# Patient Record
Sex: FEMALE | Race: WHITE | NOT HISPANIC OR LATINO | Employment: UNEMPLOYED | URBAN - METROPOLITAN AREA
[De-identification: names, ages, dates, MRNs, and addresses within clinical notes are randomized per-mention and may not be internally consistent; named-entity substitution may affect disease eponyms.]

---

## 2017-09-25 ENCOUNTER — TRANSFERRED RECORDS (OUTPATIENT)
Dept: HEALTH INFORMATION MANAGEMENT | Facility: CLINIC | Age: 42
End: 2017-09-25

## 2018-08-02 ENCOUNTER — OFFICE VISIT (OUTPATIENT)
Dept: FAMILY MEDICINE | Facility: CLINIC | Age: 43
End: 2018-08-02
Payer: COMMERCIAL

## 2018-08-02 VITALS
SYSTOLIC BLOOD PRESSURE: 112 MMHG | HEART RATE: 76 BPM | HEIGHT: 66 IN | WEIGHT: 118.8 LBS | TEMPERATURE: 98.9 F | BODY MASS INDEX: 19.09 KG/M2 | DIASTOLIC BLOOD PRESSURE: 78 MMHG

## 2018-08-02 DIAGNOSIS — F31.81 BIPOLAR 2 DISORDER (H): Primary | ICD-10-CM

## 2018-08-02 DIAGNOSIS — L98.9 SKIN LESION: ICD-10-CM

## 2018-08-02 DIAGNOSIS — N30.10 INTERSTITIAL CYSTITIS: ICD-10-CM

## 2018-08-02 PROCEDURE — 99203 OFFICE O/P NEW LOW 30 MIN: CPT | Performed by: FAMILY MEDICINE

## 2018-08-02 RX ORDER — DIAZEPAM 5 MG
5 TABLET ORAL
COMMUNITY
End: 2018-12-14

## 2018-08-02 RX ORDER — LEVOMEFOLATE CALCIUM 15 MG
15 TABLET ORAL DAILY
COMMUNITY
End: 2018-10-16

## 2018-08-02 RX ORDER — OXCARBAZEPINE 300 MG/1
600 TABLET, FILM COATED ORAL AT BEDTIME
COMMUNITY

## 2018-08-02 RX ORDER — ESCITALOPRAM OXALATE 10 MG/1
10 TABLET ORAL DAILY
COMMUNITY
End: 2018-08-27

## 2018-08-02 RX ORDER — ALPRAZOLAM 0.5 MG
0.5 TABLET ORAL DAILY PRN
COMMUNITY

## 2018-08-02 RX ORDER — ZIPRASIDONE HYDROCHLORIDE 20 MG/1
20 CAPSULE ORAL DAILY
COMMUNITY
End: 2022-11-29

## 2018-08-02 NOTE — MR AVS SNAPSHOT
After Visit Summary   8/2/2018    Nia Schneider    MRN: [id number]           Patient Information     Date Of Birth          1975        Visit Information        Provider Department      8/2/2018 10:40 AM Radha Olmstead,  [facility]        Today's Diagnoses     Interstitial cystitis    -  1    Bipolar 2 disorder (H)        Skin lesion          Care Instructions    We'll work on getting psychiatry and therapy going for you.  You should get a call in 1-2 days for help with scheduling both.  Please just let me know when you need a refill of your medications.    I also placed a dermatology referral for you.  You should call to schedule that appointment.    We'll work on getting your past medical records.      Please come back for your well woman exam when you are able.    Nice meeting you today!          Follow-ups after your visit        Additional Services     DERMATOLOGY REFERRAL       Your provider has referred you to: [facility]: [facility] [phone]   [email or web address]    Please be aware that coverage of these services is subject to the terms and limitations of your health insurance plan.  Call member services at your health plan with any benefit or coverage questions.      Please bring the following with you to your appointment:    (1) Any X-Rays, CTs or MRIs which have been performed.  Contact the facility where they were done to arrange for  prior to your scheduled appointment.    (2) List of current medications  (3) This referral request   (4) Any documents/labs given to you for this referral            MENTAL HEALTH REFERRAL  - Adult; Psychiatry and Medication Management, Outpatient Treatment; Individual/Couples/Family/Group Therapy/Health Psychology; Other: [facility] [phone]; We will contact you to schedule the appo...       All scheduling is subject to the client's specific  "insurance plan & benefits, provider/location availability, and provider clinical specialities.  Please arrive 15 minutes early for your first appointment and bring your completed paperwork.    Please be aware that coverage of these services is subject to the terms and limitations of your health insurance plan.  Call member services at your health plan with any benefit or coverage questions.                            Who to contact     Normal or non-critical lab and imaging results will be communicated to you by ConnectEduhart, letter or phone within 4 business days after the clinic has received the results. If you do not hear from us within 7 days, please contact the clinic through Mesolightt or phone. If you have a critical or abnormal lab result, we will notify you by phone as soon as possible.  Submit refill requests through [employer] or call your pharmacy and they will forward the refill request to us. Please allow 3 business days for your refill to be completed.          If you need to speak with a  for additional information , please call: [phone]           Additional Information About Your Visit        Emp[employer] Information     [employer] gives you secure access to your electronic health record. If you see a primary care provider, you can also send messages to your care team and make appointments. If you have questions, please call your primary care clinic.  If you do not have a primary care provider, please call [phone] and they will assist you.        Care EveryWhere ID     This is your Care EveryWhere ID. This could be used by other organizations to access your [place] medical records  [id number]        Your Vitals Were     Pulse Temperature Height Breastfeeding? BMI (Body Mass Index)       76 98.9  F (37.2  C) (Tympanic) 5' 6.25\" (1.683 m) No 19.03 kg/m2        Blood Pressure from Last 3 Encounters:   08/02/18 112/78    Weight from Last 3 Encounters:   08/02/18 118 lb 12.8 oz (53.9 kg)    "           We Performed the Following     DERMATOLOGY REFERRAL     MENTAL HEALTH REFERRAL  - Adult; Psychiatry and Medication Management, Outpatient Treatment; Individual/Couples/Family/Group Therapy/Health Psychology; Other: [facility] [phone]; We will contact you to schedule the appo...        Primary Care Provider Office Phone # Fax #    Radha Olmstead [phone] [address] [place] [address]        Equal Access to Services     [facility] : Hadii aad ku hadasho Soomaali, waaxda luqadaha, qaybta kaalmada adeegyada, waxay idiin hayaan adeeg lily kahn . So [facility] [phone].    ATENCIÓN: Si poppyla yinka, tiene a vieira disposición servicios gratuitos de asistencia lingüística. Llame al [phone].    We comply with applicable federal civil rights laws and [place] laws. We do not discriminate on the basis of race, color, national origin, age, disability, sex, sexual orientation, or gender identity.            Thank you!     Thank you for choosing [facility]  for your care. Our goal is always to provide you with excellent care. Hearing back from our patients is one way we can continue to improve our services. Please take a few minutes to complete the written survey that you may receive in the mail after your visit with us. Thank you!             Your Updated Medication List - Protect others around you: Learn how to safely use, store and throw away your medicines at www.disposemymeds.org.          This list is accurate as of 8/2/18 11:20 AM.  Always use your most recent med list.                   Brand Name Dispense Instructions for use Diagnosis    GEODON 20 MG capsule   Generic drug:  ziprasidone      Take 20 mg by mouth daily    Bipolar 2 disorder (H)       LEXAPRO 10 MG tablet   Generic drug:  escitalopram      Take 10 mg by mouth daily    Bipolar 2 disorder (H)       methylfolate 15 MG Tabs tablet    DEPLIN     Take 15 mg by  mouth daily    Bipolar 2 disorder (H)       TRILEPTAL 300 MG tablet   Generic drug:  OXcarbazepine      300mg in the morning and 600 mg in the evening    Bipolar 2 disorder (H)       VALIUM 5 MG tablet   Generic drug:  diazepam      Place 5 mg vaginally nightly as needed for pain    Interstitial cystitis       XANAX 0.5 MG tablet   Generic drug:  ALPRAZolam      Take 0.5 mg by mouth daily as needed for anxiety

## 2018-08-02 NOTE — NURSING NOTE
"Initial /78  Pulse 76  Temp 98.9  F (37.2  C) (Tympanic)  Ht 5' 6.25\" (1.683 m)  Wt 118 lb 12.8 oz (53.9 kg)  Breastfeeding? No  BMI 19.03 kg/m2 Estimated body mass index is 19.03 kg/(m^2) as calculated from the following:    Height as of this encounter: 5' 6.25\" (1.683 m).    Weight as of this encounter: 118 lb 12.8 oz (53.9 kg). .      "

## 2018-08-02 NOTE — PATIENT INSTRUCTIONS
We'll work on getting psychiatry and therapy going for you.  You should get a call in 1-2 days for help with scheduling both.  Please just let me know when you need a refill of your medications.    I also placed a dermatology referral for you.  You should call to schedule that appointment.    We'll work on getting your past medical records.      Please come back for your well woman exam when you are able.    Nice meeting you today!

## 2018-08-02 NOTE — PROGRESS NOTES
"  SUBJECTIVE:   Nia Schneider is a 43 year old female who presents to clinic today for the following health issues:    New Patient/Transfer of Care from [place]    * bipolar disorder - stable on medication  * lyme's disease - ? interstital cystitis.  * mole on back - present for years.  Unclear if it has changed.  No itching or bleeding.    Has a h/o bipolar 2.  Has been seeing [facility] for 4 years, on the same medications for the last 2 years.  Was discharged from [facility] in [place].  Was told she should see primary care for medication refills.  Did continue to see a therapist regularly and would like to reestablish.  Was doing blood work with [facility], no concerns with medications.    Uses xanax mostly for travel and at night.  Typically 30 tabs every 6 months or more.  Did use more regularly when she first began meds but doing much better now    *  H/o of intersitial cystitis.  Was prescribed valium to use vaginally for voiding symptoms, primarily suprapubic pain.  Feels this is effective, she uses it infrequently.  Some concern that the IC might be related to a \"chronic Lyme\" infection.  Has an appointment upcoming with a Lyme specialist,  Dr Amy Yao      * mole present for years,  has been watching.  Feels it might be changing but not sure.    Not up to date with pap or breast screening.   Would prefer to reschedule a well woman visit.        Problem list and histories reviewed & adjusted, as indicated.  Additional history: as documented    Reviewed and updated as needed this visit by clinical staff  Tobacco  Allergies  Meds  Med Hx  Surg Hx  Fam Hx  Soc Hx      Reviewed and updated as needed this visit by Provider  Tobacco  Med Hx  Surg Hx  Fam Hx  Soc Hx        ROS: Remainder of Constitutional, CV, Respiratory, GI,  negative with exception of that mentioned above    PE:  VS as above   Gen:  WN/WD/WH female in NAD   Psych: Alert and oriented times 3; coherent speech, normal   rate and " volume, able to articulate logical thoughts, able   to abstract reason, no tangential thoughts, no hallucinations   or delusions  Her affect is bright and appropriate   Skin:  Mole on central back with raised darker area and flat asymmetric area.    A?P:      ICD-10-CM    1. Bipolar 2 disorder (H) F31.81 OXcarbazepine (TRILEPTAL) 300 MG tablet     escitalopram (LEXAPRO) 10 MG tablet     ziprasidone (GEODON) 20 MG capsule     methylfolate (DEPLIN) 15 MG TABS tablet     MENTAL HEALTH REFERRAL  - Adult; Psychiatry and Medication Management, Outpatient Treatment; Individual/Couples/Family/Group Therapy/Health Psychology; Other: [facility] [phone]; We will contact you to schedule the appo...   2. Interstitial cystitis N30.10 diazepam (VALIUM) 5 MG tablet   3. Skin lesion L98.9 DERMATOLOGY REFERRAL     Patient Instructions   We'll work on getting psychiatry and therapy going for you.  You should get a call in 1-2 days for help with scheduling both.  Please just let me know when you need a refill of your medications.    I also placed a dermatology referral for you.  You should call to schedule that appointment.    We'll work on getting your past medical records.      Please come back for your well woman exam when you are able.    Nice meeting you today!    20 minutes of 20 minute visit spent reviewing past history, treatment and plans moving forward.

## 2018-08-06 ENCOUNTER — HEALTH MAINTENANCE LETTER (OUTPATIENT)
Age: 43
End: 2018-08-06

## 2018-08-07 ENCOUNTER — MYC MEDICAL ADVICE (OUTPATIENT)
Dept: FAMILY MEDICINE | Facility: CLINIC | Age: 43
End: 2018-08-07

## 2018-08-07 NOTE — TELEPHONE ENCOUNTER
Please keep and eye out for this pt's records from her psychiatrist.  She wishes to rescind her release and have them destroyed.      If they come when I am out of clinic please destroy them and let her know.    Radha Olmstead

## 2018-08-27 ENCOUNTER — MYC MEDICAL ADVICE (OUTPATIENT)
Dept: FAMILY MEDICINE | Facility: CLINIC | Age: 43
End: 2018-08-27

## 2018-08-27 DIAGNOSIS — F31.81 BIPOLAR 2 DISORDER (H): ICD-10-CM

## 2018-08-28 RX ORDER — ESCITALOPRAM OXALATE 10 MG/1
10 TABLET ORAL DAILY
Qty: 90 TABLET | Refills: 0 | Status: SHIPPED | OUTPATIENT
Start: 2018-08-28 | End: 2018-11-26

## 2018-09-05 ENCOUNTER — OFFICE VISIT (OUTPATIENT)
Dept: PSYCHOLOGY | Facility: CLINIC | Age: 43
End: 2018-09-05
Attending: FAMILY MEDICINE
Payer: COMMERCIAL

## 2018-09-05 DIAGNOSIS — F31.81 BIPOLAR II DISORDER (H): Primary | ICD-10-CM

## 2018-09-05 PROCEDURE — 90834 PSYTX W PT 45 MINUTES: CPT | Performed by: COUNSELOR

## 2018-09-05 ASSESSMENT — ANXIETY QUESTIONNAIRES
1. FEELING NERVOUS, ANXIOUS, OR ON EDGE: SEVERAL DAYS
6. BECOMING EASILY ANNOYED OR IRRITABLE: NOT AT ALL
GAD7 TOTAL SCORE: 3
IF YOU CHECKED OFF ANY PROBLEMS ON THIS QUESTIONNAIRE, HOW DIFFICULT HAVE THESE PROBLEMS MADE IT FOR YOU TO DO YOUR WORK, TAKE CARE OF THINGS AT HOME, OR GET ALONG WITH OTHER PEOPLE: SOMEWHAT DIFFICULT
5. BEING SO RESTLESS THAT IT IS HARD TO SIT STILL: NOT AT ALL
2. NOT BEING ABLE TO STOP OR CONTROL WORRYING: SEVERAL DAYS
7. FEELING AFRAID AS IF SOMETHING AWFUL MIGHT HAPPEN: NOT AT ALL
3. WORRYING TOO MUCH ABOUT DIFFERENT THINGS: SEVERAL DAYS

## 2018-09-05 ASSESSMENT — PATIENT HEALTH QUESTIONNAIRE - PHQ9: 5. POOR APPETITE OR OVEREATING: NOT AT ALL

## 2018-09-05 NOTE — PROGRESS NOTES
Progress Note - Initial Session    Client Name:  Nia Schneider Date: 9/5/18         Service Type: Individual      Session Start Time:11:00 am  Session End Time: 11:50 am      Session Length: 38 - 52      Session #: 1     Attendees: Client attended alone         Diagnostic Assessment in progress.  Unable to complete documentation at the conclusion of the first session due to time dedicated to explaining limits of confidentiality and rapport building. Client reports history of Bipolar II, diagnosed in 2014, which she is treating with medications. Client recently moved from [place] back to [place] in June 2018, and was encouraged by her previous therapist to seek additional services. Client is currently seeing Dr. Dioni Damian at [facility] for psychiatry, and will do follow-up appointments twice per year. Client notes her  is a  and is gone every other week, leading to client to be lonely and often isolated. Client reports having a strained relationship with her parents, and identified having one friend who lives in [place].     Mental Status Assessment:  Appearance:   Appropriate   Eye Contact:   Good   Psychomotor Behavior: Normal   Attitude:   Cooperative   Orientation:   All  Speech   Rate / Production: Normal    Volume:  Normal   Mood:    Normal  Affect:    Appropriate   Thought Content:  Clear   Thought Form:  Coherent  Logical   Insight:    Fair       Safety Issues and Plan for Safety and Risk Management:  Client denies current fears or concerns for personal safety.  Client denies current or recent suicidal ideation or behaviors.  Client denies current or recent homicidal ideation or behaviors.  Client denies current or recent self injurious behavior or ideation.  Client denies other safety concerns.  A safety and risk management plan has not been developed at this time, however client was given the after-hours number / 911 should there be a change in any of  these risk factors.  Client reports there are no firearms in the house.      Diagnostic Criteria:  History of Hypomania, symptoms included:  A. A distinct period of abnormally and persistently elevated, expansive, or irritable mood and abnormally and persistently increased activity or energy lasting at least 4 consecutive days and presentmost of the day, nearly every day.  B. During the period of mood disturbance and increased energy and activity, three (or more) of the following symptoms have persisted (four if the mood is only irritable), represent a nonticeable change from usual behaivor, and have been present to a degree:   - decreased need for sleep (e.g., feels rested after only 3 hours of sleep)    - flight of ideas or subjective experience that thoughts are racing   - increase in goal-directed activity  C. The episode is associated with an unequivocal change in functioning that is uncharacteristic of the person when not symptomatic  D. The disturbance in mood and the change in functioning are observable by others  E. The episode is not severe enough to cause marked impairment in social or occupational functioning or to necessitate hospitalization, and does not have psychotic features.  F. The symptoms are not attributable to the direct physiological effects of a substance or a general medical condition        DSM5 Diagnoses: (Sustained by DSM5 Criteria Listed Above)  Diagnoses: 296.89 Bipolar II Disorder Depressed and With anxious distress  Psychosocial & Contextual Factors: Limited support network, Family conflict  WHODAS 2.0 (12 item)            This questionnaire asks about difficulties due to health conditions. Health conditions  include  disease or illnesses, other health problems that may be short or long lasting,  injuries, mental health or emotional problems, and problems with alcohol or drugs.                     Think back over the past 30 days and answer these questions, thinking about how much   difficulty you had doing the following activities. For each question, please [place] only  one response.    S1 Standing for long periods such as 30 minutes? None =         1   S2 Taking care of household responsibilities? None =         1   S3 Learning a new task, for example, learning how to get to a new place? None =         1   S4 How much of a problem do you have joining community activities (for example, festivals, Congregational or other activities) in the same way as anyone else can? Mild =           2   S5 How much have you been emotionally affected by your health problems? Moderate =   3     In the past 30 days, how much difficulty did you have in:   S6 Concentrating on doing something for ten minutes? None =         1   S7 Walking a long distance such as a kilometer (or equivalent)? Mild =           2   S8 Washing your whole body? None =         1   S9 Getting dressed? None =         1   S10 Dealing with people you do not know? Mild =           2   S11 Maintaining a friendship? None =         1   S12 Your day to day work? Mild =           2     H1 Overall, in the past 30 days, how many days were these difficulties present? Record number of days 25   H2 In the past 30 days, for how many days were you totally unable to carry out your usual activities or work because of any health condition? Record number of days  0   H3 In the past 30 days, not counting the days that you were totally unable, for how many days did you cut back or reduce your usual activities or work because of any health condition? Record number of days 30       Collateral Reports Completed:  Routed note to PCP      PLAN: (Homework, other):  Follow-up appointment scheduled to complete Diagnostic Interview.         Chayito Osman MA, [facility]

## 2018-09-05 NOTE — Clinical Note
Misa Christian Dr. attended her initial session today. Will send over the completed DA once completed.  Thanks! Samina

## 2018-09-05 NOTE — MR AVS SNAPSHOT
MRN:[id number]                      After Visit Summary   9/5/2018    Nia Schneider    MRN: [id number]           Visit Information        Provider Department      9/5/2018 11:00 AM [facility]      Your next 10 appointments already scheduled     Sep 06, 2018 11:20 AM CDT   [facility] Radha Olmstead,    [facility]    [address] [address]   [phone]            Sep 12, 2018  9:00 AM CDT   Return Visit with Chayito Osman [facility] ([facility]    [address] [id number] [phone]            Oct 03, 2018 10:00 AM CDT   New Visit with Rebecca Jackman [facility]-C   [facility]    [address] [id number] [phone]            Oct 04, 2018 12:30 PM CDT   [facility]    [address] [id number] [phone]              MyChart Information     [facility] gives you secure access to your electronic health record. If you see a primary care provider, you can also send messages to your care team and make appointments. If you have questions, please call your primary care clinic.  If you do not have a primary care provider, please call [phone] and they will assist you.        Care EveryWhere ID     This is your Care EveryWhere ID. This could be used by other organizations to access your [place] medical records  [id number]        Equal Access to Services     PAVAN QUIROS AH: Hadii aad ku hadasho Sojuaquinali, waaxda luqadaha, qaybta kaalmada adeegyada, fran kulkarni. So [facility] [phone].    ATENCIÓN: Si habla español, tiene a vieira disposición servicios gratuitos de asistencia lingüística. Llame al  [phone].    We comply with applicable federal civil rights laws and [place] laws. We do not discriminate on the basis of race, color, national origin, age, disability, sex, sexual orientation, or gender identity.

## 2018-09-06 ENCOUNTER — OFFICE VISIT (OUTPATIENT)
Dept: FAMILY MEDICINE | Facility: CLINIC | Age: 43
End: 2018-09-06
Payer: COMMERCIAL

## 2018-09-06 VITALS
HEIGHT: 66 IN | TEMPERATURE: 97.8 F | SYSTOLIC BLOOD PRESSURE: 110 MMHG | DIASTOLIC BLOOD PRESSURE: 78 MMHG | WEIGHT: 119.4 LBS | HEART RATE: 68 BPM | BODY MASS INDEX: 19.19 KG/M2

## 2018-09-06 DIAGNOSIS — Z23 NEED FOR PROPHYLACTIC VACCINATION AND INOCULATION AGAINST INFLUENZA: ICD-10-CM

## 2018-09-06 DIAGNOSIS — Z11.51 SCREENING FOR HUMAN PAPILLOMAVIRUS: ICD-10-CM

## 2018-09-06 DIAGNOSIS — Z13.6 CARDIOVASCULAR SCREENING; LDL GOAL LESS THAN 160: ICD-10-CM

## 2018-09-06 DIAGNOSIS — Z12.4 SCREENING FOR CERVICAL CANCER: ICD-10-CM

## 2018-09-06 DIAGNOSIS — Z01.419 ENCOUNTER FOR GYNECOLOGICAL EXAMINATION WITHOUT ABNORMAL FINDING: Primary | ICD-10-CM

## 2018-09-06 DIAGNOSIS — Z12.31 ENCOUNTER FOR SCREENING MAMMOGRAM FOR BREAST CANCER: ICD-10-CM

## 2018-09-06 PROCEDURE — 87624 HPV HI-RISK TYP POOLED RSLT: CPT | Performed by: FAMILY MEDICINE

## 2018-09-06 PROCEDURE — 90686 IIV4 VACC NO PRSV 0.5 ML IM: CPT | Performed by: FAMILY MEDICINE

## 2018-09-06 PROCEDURE — 99396 PREV VISIT EST AGE 40-64: CPT | Mod: 25 | Performed by: FAMILY MEDICINE

## 2018-09-06 PROCEDURE — G0145 SCR C/V CYTO,THINLAYER,RESCR: HCPCS | Performed by: FAMILY MEDICINE

## 2018-09-06 PROCEDURE — 90471 IMMUNIZATION ADMIN: CPT | Performed by: FAMILY MEDICINE

## 2018-09-06 ASSESSMENT — PATIENT HEALTH QUESTIONNAIRE - PHQ9: SUM OF ALL RESPONSES TO PHQ QUESTIONS 1-9: 5

## 2018-09-06 ASSESSMENT — ANXIETY QUESTIONNAIRES: GAD7 TOTAL SCORE: 3

## 2018-09-06 NOTE — PROGRESS NOTES
SUBJECTIVE:   CC: Nia Schneider is an 43 year old woman who presents for preventive health visit.     Answers for HPI/ROS submitted by the patient on 9/5/2018   Annual Exam:  Getting at least 3 servings of Calcium per day:: Yes  Bi-annual eye exam:: NO  Dental care twice a year:: Yes  Sleep apnea or symptoms of sleep apnea:: None  Diet:: Other  Frequency of exercise:: 2-3 days/week  Taking medications regularly:: Yes  Medication side effects:: None  Additional concerns today:: No  PHQ-2 Score: 0  Duration of exercise:: 45-60 minutes      Today's PHQ-2 Score:   PHQ-2 ( 1999 [employer]) 9/5/2018   Q1: Little interest or pleasure in doing things 0   Q2: Feeling down, depressed or hopeless 0   PHQ-2 Score 0   Q1: Little interest or pleasure in doing things Not at all   Q2: Feeling down, depressed or hopeless Not at all   PHQ-2 Score 0       Abuse: Current or Past(Physical, Sexual or Emotional)- No  Do you feel safe in your environment - Yes    Social History   Substance Use Topics     Smoking status: Never Smoker     Smokeless tobacco: Never Used     Alcohol use No     If you drink alcohol do you typically have >3 drinks per day or >7 drinks per week? No                     Reviewed orders with patient.  Reviewed health maintenance and updated orders accordingly - Yes    Patient under age 50, mutual decision reflected in health maintenance.      Pertinent mammograms are reviewed under the imaging tab.  History of abnormal Pap smear: NO - age 30- 65 PAP every 3 years recommended  PAP / HPV 9/6/2018   PAP NIL     Reviewed and updated as needed this visit by clinical staff  Tobacco  Allergies  Meds  Med Hx  Surg Hx  Fam Hx  Soc Hx        Reviewed and updated as needed this visit by Provider  Tobacco  Meds  Med Hx  Surg Hx  Fam Hx  Soc Hx       History reviewed. No pertinent past medical history.      ROS:  CONSTITUTIONAL: NEGATIVE for fever, chills, change in weight. Occasional tension headaches when chewing  "gum.  EYES: NEGATIVE for vision changes or irritation  ENT: NEGATIVE for ear, mouth and throat problems  RESP: NEGATIVE for significant cough or SOB  CV: NEGATIVE for chest pain, palpitations or peripheral edema  GI: NEGATIVE for nausea, abdominal pain, heartburn, or change in bowel habits  : NEGATIVE for unusual urinary or vaginal symptoms. No periods since ablation. No painful urination. Interstitial cystitis controlled with once daily vaginal valium.  MUSCULOSKELETAL: NEGATIVE for significant arthralgias or myalgia  PSYCHIATRIC: NEGATIVE for changes in mood or affect    OBJECTIVE:   /78  Pulse 68  Temp 97.8  F (36.6  C) (Tympanic)  Ht 5' 6.25\" (1.683 m)  Wt 119 lb 6.4 oz (54.2 kg)  Breastfeeding? No  BMI 19.13 kg/m2  EXAM:  GENERAL: healthy, alert and no distress  NECK: no adenopathy, no asymmetry, masses, or scars and thyroid normal to palpation  RESP: lungs clear to auscultation - no rales, rhonchi or wheezes  CV: regular rate and rhythm, normal S1 S2, no S3 or S4, no murmur, click or rub, no peripheral edema and peripheral pulses strong  ABDOMEN: soft, nontender, no hepatosplenomegaly, no masses and bowel sounds normal  MS: no gross musculoskeletal defects noted, no edema  : Pap smear was performed. Exam was unremarkable.  Breast: Exam unremarkable.    Diagnostic Test Results:  none     ASSESSMENT/PLAN:       ICD-10-CM    1. Encounter for gynecological examination without abnormal finding Z01.419    2. CARDIOVASCULAR SCREENING; LDL GOAL LESS THAN 160 Z13.6 Lipid panel reflex to direct LDL Fasting   3. Encounter for screening mammogram for breast cancer Z12.31 MA Screening Digital Bilateral   4. Screening for cervical cancer Z12.4 Pap imaged thin layer screen with HPV - recommended age 30 - 65 years (select HPV order below)   5. Screening for human papillomavirus Z11.51 HPV High Risk Types DNA Cervical   6. Need for prophylactic vaccination and inoculation against influenza Z23 FLU VACCINE, " "SPLIT VIRUS, IM (QUADRIVALENT) [81916]- >3 YRS     Vaccine Administration, Initial [17281]         COUNSELING:   Reviewed preventive health counseling, as reflected in patient instructions    BP Readings from Last 1 Encounters:   09/06/18 110/78     Estimated body mass index is 19.13 kg/(m^2) as calculated from the following:    Height as of this encounter: 5' 6.25\" (1.683 m).    Weight as of this encounter: 119 lb 6.4 oz (54.2 kg).           reports that she has never smoked. She has never used smokeless tobacco.      Counseling Resources:  ATP IV Guidelines  Pooled Cohorts Equation Calculator  Breast Cancer Risk Calculator  FRAX Risk Assessment  ICSI Preventive Guidelines  Dietary Guidelines for Americans, 2010  [employer]'s MyPlate  ASA Prophylaxis  Lung CA Screening    Radha Olmstead, DO  [facility]    Injectable Influenza Immunization Documentation    1.  Is the person to be vaccinated sick today?   No    2. Does the person to be vaccinated have an allergy to a component   of the vaccine?   No  Egg Allergy Algorithm Link    3. Has the person to be vaccinated ever had a serious reaction   to influenza vaccine in the past?   No    4. Has the person to be vaccinated ever had Guillain-Barré syndrome?   No    Form completed by Jacqueline Phillips CMA    Patient Instructions   You will need to be fasting for 12 hours (nothing but water) prior to your blood work.    You can call [phone] to schedule your mammogram      Preventive Health Recommendations  Female Ages 40 to 49    Yearly exam:     See your health care provider every year in order to  1. Review health changes.   2. Discuss preventive care.    3. Review your medicines if your doctor prescribed any.      Get a Pap test every three years (unless you have an abnormal result and your provider advises testing more often).      If you get Pap tests with HPV test, you only need to test every 5 years, unless you have an abnormal result. You do not need a " Pap test if your uterus was removed (hysterectomy) and you have not had cancer.      You should be tested each year for STDs (sexually transmitted diseases), if you're at risk.     Ask your doctor if you should have a mammogram.      Have a colonoscopy (test for colon cancer) if someone in your family has had colon cancer or polyps before age 50.       Have a cholesterol test every 5 years.       Have a diabetes test (fasting glucose) after age 45. If you are at risk for diabetes, you should have this test every 3 years.    Shots: Get a flu shot each year. Get a tetanus shot every 10 years.     Nutrition:     Eat at least 5 servings of fruits and vegetables each day.    Eat whole-grain bread, whole-wheat pasta and brown rice instead of white grains and rice.    Get adequate Calcium and Vitamin D.      Lifestyle    Exercise at least 150 minutes a week (an average of 30 minutes a day, 5 days a week). This will help you control your weight and prevent disease.    Limit alcohol to one drink per day.    No smoking.     Wear sunscreen to prevent skin cancer.    See your dentist every six months for an exam and cleaning.

## 2018-09-06 NOTE — PATIENT INSTRUCTIONS
You will need to be fasting for 12 hours (nothing but water) prior to your blood work.    You can call [phone] to schedule your mammogram      Preventive Health Recommendations  Female Ages 40 to 49    Yearly exam:     See your health care provider every year in order to  1. Review health changes.   2. Discuss preventive care.    3. Review your medicines if your doctor prescribed any.      Get a Pap test every three years (unless you have an abnormal result and your provider advises testing more often).      If you get Pap tests with HPV test, you only need to test every 5 years, unless you have an abnormal result. You do not need a Pap test if your uterus was removed (hysterectomy) and you have not had cancer.      You should be tested each year for STDs (sexually transmitted diseases), if you're at risk.     Ask your doctor if you should have a mammogram.      Have a colonoscopy (test for colon cancer) if someone in your family has had colon cancer or polyps before age 50.       Have a cholesterol test every 5 years.       Have a diabetes test (fasting glucose) after age 45. If you are at risk for diabetes, you should have this test every 3 years.    Shots: Get a flu shot each year. Get a tetanus shot every 10 years.     Nutrition:     Eat at least 5 servings of fruits and vegetables each day.    Eat whole-grain bread, whole-wheat pasta and brown rice instead of white grains and rice.    Get adequate Calcium and Vitamin D.      Lifestyle    Exercise at least 150 minutes a week (an average of 30 minutes a day, 5 days a week). This will help you control your weight and prevent disease.    Limit alcohol to one drink per day.    No smoking.     Wear sunscreen to prevent skin cancer.    See your dentist every six months for an exam and cleaning.

## 2018-09-06 NOTE — MR AVS SNAPSHOT
After Visit Summary   9/6/2018    Nia Schneider    MRN: [id number]           Patient Information     Date Of Birth          1975        Visit Information        Provider Department      9/6/2018 11:20 AM Radha Olmstead DO [facility]        Today's Diagnoses     CARDIOVASCULAR SCREENING; LDL GOAL LESS THAN 160    -  1    Encounter for screening mammogram for breast cancer        Screening for cervical cancer        Screening for human papillomavirus          Care Instructions    You will need to be fasting for 12 hours (nothing but water) prior to your blood work.    You can call [phone] to schedule your mammogram      Preventive Health Recommendations  Female Ages 40 to 49    Yearly exam:     See your health care provider every year in order to  1. Review health changes.   2. Discuss preventive care.    3. Review your medicines if your doctor prescribed any.      Get a Pap test every three years (unless you have an abnormal result and your provider advises testing more often).      If you get Pap tests with HPV test, you only need to test every 5 years, unless you have an abnormal result. You do not need a Pap test if your uterus was removed (hysterectomy) and you have not had cancer.      You should be tested each year for STDs (sexually transmitted diseases), if you're at risk.     Ask your doctor if you should have a mammogram.      Have a colonoscopy (test for colon cancer) if someone in your family has had colon cancer or polyps before age 50.       Have a cholesterol test every 5 years.       Have a diabetes test (fasting glucose) after age 45. If you are at risk for diabetes, you should have this test every 3 years.    Shots: Get a flu shot each year. Get a tetanus shot every 10 years.     Nutrition:     Eat at least 5 servings of fruits and vegetables each day.    Eat whole-grain bread, whole-wheat pasta and brown rice instead of white grains and rice.    Get  adequate Calcium and Vitamin D.      Lifestyle    Exercise at least 150 minutes a week (an average of 30 minutes a day, 5 days a week). This will help you control your weight and prevent disease.    Limit alcohol to one drink per day.    No smoking.     Wear sunscreen to prevent skin cancer.    See your dentist every six months for an exam and cleaning.          Follow-ups after your visit        Your next 10 appointments already scheduled     Oct 03, 2018 10:00 AM CDT   New Visit with Rebecca Jackman PA-C   [facility]    [address] [address]   [phone]              Future tests that were ordered for you today     Open Future Orders        Priority Expected Expires Ordered    Lipid panel reflex to direct LDL Fasting Routine 9/7/2018 9/6/2019 9/6/2018    MA Screening Digital Bilateral Routine  9/6/2019 9/6/2018            Who to contact     Normal or non-critical lab and imaging results will be communicated to you by [employer], letter or phone within 4 business days after the clinic has received the results. If you do not hear from us within 7 days, please contact the clinic through [employer] or phone. If you have a critical or abnormal lab result, we will notify you by phone as soon as possible.  Submit refill requests through [employer] or call your pharmacy and they will forward the refill request to us. Please allow 3 business days for your refill to be completed.          If you need to speak with a  for additional information , please call: [phone]           Additional Information About Your Visit        [employer] Information     [employer] gives you secure access to your electronic health record. If you see a primary care provider, you can also send messages to your care team and make appointments. If you have questions, please call your primary care clinic.  If you do not have a primary care provider, please call  "[phone] and they will assist you.        Care EveryWhere ID     This is your Care EveryWhere ID. This could be used by other organizations to access your [facility] medical records  [id number]        Your Vitals Were     Pulse Temperature Height Breastfeeding? BMI (Body Mass Index)       68 97.8  F (36.6  C) (Tympanic) 5' 6.25\" (1.683 m) No 19.13 kg/m2        Blood Pressure from Last 3 Encounters:   09/06/18 110/78   08/02/18 112/78    Weight from Last 3 Encounters:   09/06/18 119 lb 6.4 oz (54.2 kg)   08/02/18 118 lb 12.8 oz (53.9 kg)              We Performed the Following     HPV High Risk Types DNA Cervical     Pap imaged thin layer screen with HPV - recommended age 30 - 65 years (select HPV order below)        Primary Care Provider Office Phone # Fax #    Radha Burnham DO [facility] [phone] [address] [place] [address]        Equal Access to Services     RAZA QUIROS : Hadii aad ku hadasho Soomaali, waaxda luqadaha, qaybta kaalmada adeegyada, waxay robert kahn . So [facility] [phone].    ATENCIÓN: Si habla español, tiene a vieira disposición servicios gratuitos de asistencia lingüística. Llame al [phone].    We comply with applicable federal civil rights laws and [place] laws. We do not discriminate on the basis of race, color, national origin, age, disability, sex, sexual orientation, or gender identity.            Thank you!     Thank you for choosing [facility] [place]  for your care. Our goal is always to provide you with excellent care. Hearing back from our patients is one way we can continue to improve our services. Please take a few minutes to complete the written survey that you may receive in the mail after your visit with us. Thank you!             Your Updated Medication List - Protect others around you: Learn how to safely use, store and throw away your medicines at www.disposemymeds.org.          This list is accurate as of 9/6/18 " 12:27 PM.  Always use your most recent med list.                   Brand Name Dispense Instructions for use Diagnosis    escitalopram 10 MG tablet    LEXAPRO    90 tablet    Take 1 tablet (10 mg) by mouth daily    Bipolar 2 disorder (H)       GEODON 20 MG capsule   Generic drug:  ziprasidone      Take 20 mg by mouth daily    Bipolar 2 disorder (H)       methylfolate 15 MG Tabs tablet    DEPLIN     Take 15 mg by mouth daily    Bipolar 2 disorder (H)       TRILEPTAL 300 MG tablet   Generic drug:  OXcarbazepine      300mg in the morning and 600 mg in the evening    Bipolar 2 disorder (H)       VALIUM 5 MG tablet   Generic drug:  diazepam      Place 5 mg vaginally nightly as needed for pain    Interstitial cystitis       XANAX 0.5 MG tablet   Generic drug:  ALPRAZolam      Take 0.5 mg by mouth daily as needed for anxiety

## 2018-09-06 NOTE — NURSING NOTE
"Initial /78  Pulse 68  Temp 97.8  F (36.6  C) (Tympanic)  Ht 5' 6.25\" (1.683 m)  Wt 119 lb 6.4 oz (54.2 kg)  Breastfeeding? No  BMI 19.13 kg/m2 Estimated body mass index is 19.13 kg/(m^2) as calculated from the following:    Height as of this encounter: 5' 6.25\" (1.683 m).    Weight as of this encounter: 119 lb 6.4 oz (54.2 kg). .      "

## 2018-09-10 DIAGNOSIS — Z13.6 CARDIOVASCULAR SCREENING; LDL GOAL LESS THAN 160: ICD-10-CM

## 2018-09-10 LAB
CHOLEST SERPL-MCNC: 192 MG/DL
HDLC SERPL-MCNC: 84 MG/DL
LDLC SERPL CALC-MCNC: 92 MG/DL
NONHDLC SERPL-MCNC: 108 MG/DL
TRIGL SERPL-MCNC: 79 MG/DL

## 2018-09-10 PROCEDURE — 80061 LIPID PANEL: CPT | Performed by: FAMILY MEDICINE

## 2018-09-10 PROCEDURE — 36415 COLL VENOUS BLD VENIPUNCTURE: CPT | Performed by: FAMILY MEDICINE

## 2018-09-11 LAB
COPATH REPORT: NORMAL
PAP: NORMAL

## 2018-09-12 LAB
FINAL DIAGNOSIS: NORMAL
HPV HR 12 DNA CVX QL NAA+PROBE: NEGATIVE
HPV16 DNA SPEC QL NAA+PROBE: NEGATIVE
HPV18 DNA SPEC QL NAA+PROBE: NEGATIVE
SPECIMEN DESCRIPTION: NORMAL
SPECIMEN SOURCE CVX/VAG CYTO: NORMAL

## 2018-10-03 ENCOUNTER — OFFICE VISIT (OUTPATIENT)
Dept: DERMATOLOGY | Facility: CLINIC | Age: 43
End: 2018-10-03
Payer: COMMERCIAL

## 2018-10-03 VITALS — SYSTOLIC BLOOD PRESSURE: 112 MMHG | HEART RATE: 72 BPM | OXYGEN SATURATION: 99 % | DIASTOLIC BLOOD PRESSURE: 76 MMHG

## 2018-10-03 DIAGNOSIS — B36.0 PV (PITYRIASIS VERSICOLOR): ICD-10-CM

## 2018-10-03 DIAGNOSIS — D18.01 CHERRY ANGIOMA: ICD-10-CM

## 2018-10-03 DIAGNOSIS — D22.9 MULTIPLE BENIGN NEVI: Primary | ICD-10-CM

## 2018-10-03 DIAGNOSIS — L82.1 SEBORRHEIC KERATOSIS: ICD-10-CM

## 2018-10-03 PROCEDURE — 99203 OFFICE O/P NEW LOW 30 MIN: CPT | Performed by: PHYSICIAN ASSISTANT

## 2018-10-03 NOTE — NURSING NOTE
Chief Complaint   Patient presents with     Derm Problem     mole check       Vitals:    10/03/18 0950   BP: 112/76   Pulse: 72   SpO2: 99%     Wt Readings from Last 1 Encounters:   09/06/18 54.2 kg (119 lb 6.4 oz)     Chrissy Parkinson LPN.................10/3/2018

## 2018-10-03 NOTE — PROGRESS NOTES
Nia Schneider is a 43 year old year old female patient here today for spot on back and clavicle.  Patient reports that spot on back is new. She notes that it feels rougher than her normal moles. She also notes mole on clavicle reports that it has been present since she was young and remains unchanged. She does wear sunscreen. Patient has no other skin complaints today.  Remainder of the HPI, Meds, PMH, Allergies, FH, and SH was reviewed in chart.    Pertinent Hx:   No personal history of skin cancer.   History reviewed. No pertinent past medical history.    Past Surgical History:   Procedure Laterality Date     SURGICAL HISTORY OF -  Right 2009    ganglion cyst        Family History   Problem Relation Age of Onset     Chronic Obstructive Pulmonary Disease Mother      Osteoarthritis Mother      Hypertension Father      HEART DISEASE Father      Chronic Obstructive Pulmonary Disease Father      Diabetes No family hx of      Colon Cancer No family hx of      Breast Cancer No family hx of        Social History     Social History     Marital status:      Spouse name: N/A     Number of children: N/A     Years of education: N/A     Occupational History     Not on file.     Social History Main Topics     Smoking status: Never Smoker     Smokeless tobacco: Never Used     Alcohol use No     Drug use: No     Sexual activity: Yes     Partners: Male     Birth control/ protection: Male Surgical      Comment:  had vas     Other Topics Concern     Not on file     Social History Narrative       Outpatient Encounter Prescriptions as of 10/3/2018   Medication Sig Dispense Refill     ALPRAZolam (XANAX) 0.5 MG tablet Take 0.5 mg by mouth daily as needed for anxiety       diazepam (VALIUM) 5 MG tablet Place 5 mg vaginally nightly as needed for pain       escitalopram (LEXAPRO) 10 MG tablet Take 1 tablet (10 mg) by mouth daily 90 tablet 0     methylfolate (DEPLIN) 15 MG TABS tablet Take 15 mg by mouth daily        OXcarbazepine (TRILEPTAL) 300 MG tablet 300mg in the morning and 600 mg in the evening        ziprasidone (GEODON) 20 MG capsule Take 20 mg by mouth daily       No facility-administered encounter medications on file as of 10/3/2018.              Review Of Systems  Skin: As above  Eyes: negative  Ears/Nose/Throat: negative  Respiratory: No shortness of breath, dyspnea on exertion, cough, or hemoptysis  Cardiovascular: negative  Gastrointestinal: negative  Genitourinary: negative  Musculoskeletal: negative  Neurologic: negative  Psychiatric: negative  Hematologic/Lymphatic/Immunologic: negative  Endocrine: negative      O:   NAD, WDWN, Alert & Oriented, Mood & Affect wnl, Vitals stable   Here today alone   /76  Pulse 72  SpO2 99%   General appearance normal   Vitals stable   Alert, oriented and in no acute distress      Brown stuck on papules on back   Red papules on back   Brown papules and macules with regular pigment network and border on back and clavicle   Thin brown dry patch on back consistent with pityriasis versicolor       Eyes: Conjunctivae/lids:Normal     ENT: Lips: normal    MSK:Normal    Pulm: Breathing Normal    Neuro/Psych: Orientation:Normal; Mood/Affect:Normal  A/P:  1. Pityriasis versicolor   Patient decided against treatment.   2. Benign nevi, seborrheic keratosis, cherry angioma  BENIGN LESIONS DISCUSSED WITH PATIENT:  I discussed the specifics of tumor, prognosis, and genetics of benign lesions.  I explained that treatment of these lesions would be purely cosmetic and not medically neccessary.  I discussed with patient different removal options including excision, cautery and /or laser.      Nature and genetics of benign skin lesions dicussed with patient.  Signs and Symptoms of skin cancer discussed with patient.  ABCDEs of melanoma reviewed with patient.  Patient encouraged to perform monthly skin exams.  UV precautions reviewed with patient  Risks of non-melanoma skin cancer discussed  with patient   Return to clinic as needed.

## 2018-10-03 NOTE — MR AVS SNAPSHOT
After Visit Summary   10/3/2018    Nia Schneider    MRN: [id number]           Patient Information     Date Of Birth          1975        Visit Information        Provider Department      10/3/2018 10:00 AM Rebecca Jackman PA-C [facility]        Today's Diagnoses     Multiple benign nevi    -  1    Seborrheic keratosis        Cherry angioma        PV (pityriasis versicolor)           Follow-ups after your visit        Your next 10 appointments already scheduled     Oct 18, 2018  8:45 AM CDT   MA SCREENING DIGITAL BILATERAL with LLMA1   [facility] ([facility])    [address] [address]   [phone]           How do I prepare for my exam? (Food and drink instructions) No Food and Drink Restrictions.  How do I prepare for my exam? (Other instructions) Do not use any powder, lotion or deodorant under your arms or on your breast. If you do, we will ask you to remove it before your exam.  What should I wear: Wear comfortable, two-piece clothing.  How long does the exam take: Most scans will take 15 minutes.  What should I bring: Bring any previous mammograms from other facilities or have them mailed to the breast center.  Do I need a :  No  is needed.  What do I need to tell my doctor: If you have any allergies, tell your care team.  What should I do after the exam: No restrictions, You may resume normal activities.  What is this test: This test is an x-ray of the breast to look for breast disease. The breast is pressed between two plates to flatten and spread the tissue. An X-ray is taken of the breast from different angles.  Who should I call with questions: If you have any questions, please call the Imaging Department where you will have your exam. Directions, parking instructions, and other information is available on our website, [email or web address].  Other information about my exam Three-dimensional  "(3D) mammograms are available at [facility] locations in [facility], [place], [place], [facility], [place], [facility] and [place].  Health locations include [place] and the [facility] in [place].  Benefits of 3D mammograms include * Improved rate of cancer detection * Decreases your chance of having to go back for more tests, which means fewer: * \"False-positive\" results (This means that there is an abnormal area but it isn't cancer.) * Invasive testing procedures, such as a biopsy or surgery * Can provide clearer images of the breast if you have dense breast tissue.  *3D mammography is an optional exam that anyone can have with a 2D mammogram. It doesn't replace or take the place of a 2D mammogram. 2D mammograms remain an effective screening test for all women.  Not all insurance companies cover the cost of a 3D mammogram. Check with your insurance.              Who to contact     If you have questions or need follow up information about today's clinic visit or your schedule please contact [facility] directly at [phone].  Normal or non-critical lab and imaging results will be communicated to you by MyLorryhart, letter or phone within 4 business days after the clinic has received the results. If you do not hear from us within 7 days, please contact the clinic through [facility]Times or phone. If you have a critical or abnormal lab result, we will notify you by phone as soon as possible.  Submit refill requests through R[facility]Times or call your pharmacy and they will forward the refill request to us. Please allow 3 business days for your refill to be completed.          Additional Information About Your Visit        R[employer]Times Information     [employer] gives you secure access to your electronic health record. If you see a primary care provider, you can also send messages to your care team and make appointments. If you have questions, please call your primary care clinic.  If you " do not have a primary care provider, please call [phone] and they will assist you.        Care EveryWhere ID     This is your Care EveryWhere ID. This could be used by other organizations to access your [facility] medical records  [id number]        Your Vitals Were     Pulse Pulse Oximetry                72 99%           Blood Pressure from Last 3 Encounters:   10/03/18 112/76   09/06/18 110/78   08/02/18 112/78    Weight from Last 3 Encounters:   09/06/18 54.2 kg (119 lb 6.4 oz)   08/02/18 53.9 kg (118 lb 12.8 oz)              Today, you had the following     No orders found for display       Primary Care Provider Office Phone # Fax #    Radha Burnham Ishan,  [phone] [address] [place] [address]        Equal Access to Services     [facility] : Hadii brandon mobley hadasho Sojuaquinali, waaxda luqadaha, qaybta kaalmada adeshrutiyada, fran kahn . So [facility] [phone].    ATENCIÓN: Si habla español, tiene a vieira disposición servicios gratuitos de asistencia lingüística. Sneha al [phone].    We comply with applicable federal civil rights laws and [place] laws. We do not discriminate on the basis of race, color, national origin, age, disability, sex, sexual orientation, or gender identity.            Thank you!     Thank you for choosing [facility]  for your care. Our goal is always to provide you with excellent care. Hearing back from our patients is one way we can continue to improve our services. Please take a few minutes to complete the written survey that you may receive in the mail after your visit with us. Thank you!             Your Updated Medication List - Protect others around you: Learn how to safely use, store and throw away your medicines at www.disposemymeds.org.          This list is accurate as of 10/3/18 12:46 PM.  Always use your most recent med list.                   Brand Name Dispense Instructions for use Diagnosis     escitalopram 10 MG tablet    LEXAPRO    90 tablet    Take 1 tablet (10 mg) by mouth daily    Bipolar 2 disorder (H)       GEODON 20 MG capsule   Generic drug:  ziprasidone      Take 20 mg by mouth daily    Bipolar 2 disorder (H)       methylfolate 15 MG Tabs tablet    DEPLIN     Take 15 mg by mouth daily    Bipolar 2 disorder (H)       TRILEPTAL 300 MG tablet   Generic drug:  OXcarbazepine      300mg in the morning and 600 mg in the evening    Bipolar 2 disorder (H)       VALIUM 5 MG tablet   Generic drug:  diazepam      Place 5 mg vaginally nightly as needed for pain    Interstitial cystitis       XANAX 0.5 MG tablet   Generic drug:  ALPRAZolam      Take 0.5 mg by mouth daily as needed for anxiety

## 2018-10-03 NOTE — LETTER
10/3/2018         RE: Nia Schneider  [address]  [facility] [address]        Dear Colleague,    Thank you for referring your patient, Nia Schneider, to the [facility]. Please see a copy of my visit note below.    Nia Schneider is a 43 year old year old female patient here today for spot on back and clavicle.  Patient reports that spot on back is new. She notes that it feels rougher than her normal moles. She also notes mole on clavicle reports that it has been present since she was young and remains unchanged. She does wear sunscreen. Patient has no other skin complaints today.  Remainder of the HPI, Meds, PMH, Allergies, FH, and SH was reviewed in chart.    Pertinent Hx:   No personal history of skin cancer.   History reviewed. No pertinent past medical history.    Past Surgical History:   Procedure Laterality Date     SURGICAL HISTORY OF -  Right 2009    ganglion cyst        Family History   Problem Relation Age of Onset     Chronic Obstructive Pulmonary Disease Mother      Osteoarthritis Mother      Hypertension Father      HEART DISEASE Father      Chronic Obstructive Pulmonary Disease Father      Diabetes No family hx of      Colon Cancer No family hx of      Breast Cancer No family hx of        Social History     Social History     Marital status:      Spouse name: N/A     Number of children: N/A     Years of education: N/A     Occupational History     Not on file.     Social History Main Topics     Smoking status: Never Smoker     Smokeless tobacco: Never Used     Alcohol use No     Drug use: No     Sexual activity: Yes     Partners: Male     Birth control/ protection: Male Surgical      Comment:  had vas     Other Topics Concern     Not on file     Social History Narrative       Outpatient Encounter Prescriptions as of 10/3/2018   Medication Sig Dispense Refill     ALPRAZolam (XANAX) 0.5 MG tablet Take 0.5 mg by mouth daily as needed for anxiety        diazepam (VALIUM) 5 MG tablet Place 5 mg vaginally nightly as needed for pain       escitalopram (LEXAPRO) 10 MG tablet Take 1 tablet (10 mg) by mouth daily 90 tablet 0     methylfolate (DEPLIN) 15 MG TABS tablet Take 15 mg by mouth daily       OXcarbazepine (TRILEPTAL) 300 MG tablet 300mg in the morning and 600 mg in the evening        ziprasidone (GEODON) 20 MG capsule Take 20 mg by mouth daily       No facility-administered encounter medications on file as of 10/3/2018.              Review Of Systems  Skin: As above  Eyes: negative  Ears/Nose/Throat: negative  Respiratory: No shortness of breath, dyspnea on exertion, cough, or hemoptysis  Cardiovascular: negative  Gastrointestinal: negative  Genitourinary: negative  Musculoskeletal: negative  Neurologic: negative  Psychiatric: negative  Hematologic/Lymphatic/Immunologic: negative  Endocrine: negative      O:   NAD, WDWN, Alert & Oriented, Mood & Affect wnl, Vitals stable   Here today alone   /76  Pulse 72  SpO2 99%   General appearance normal   Vitals stable   Alert, oriented and in no acute distress      Brown stuck on papules on back   Red papules on back   Brown papules and macules with regular pigment network and border on back and clavicle   Thin brown dry patch on back consistent with pityriasis versicolor       Eyes: Conjunctivae/lids:Normal     ENT: Lips: normal    MSK:Normal    Pulm: Breathing Normal    Neuro/Psych: Orientation:Normal; Mood/Affect:Normal  A/P:  1. Pityriasis versicolor   Patient decided against treatment.   2. Benign nevi, seborrheic keratosis, cherry angioma  BENIGN LESIONS DISCUSSED WITH PATIENT:  I discussed the specifics of tumor, prognosis, and genetics of benign lesions.  I explained that treatment of these lesions would be purely cosmetic and not medically neccessary.  I discussed with patient different removal options including excision, cautery and /or laser.      Nature and genetics of benign skin lesions dicussed with  patient.  Signs and Symptoms of skin cancer discussed with patient.  ABCDEs of melanoma reviewed with patient.  Patient encouraged to perform monthly skin exams.  UV precautions reviewed with patient  Risks of non-melanoma skin cancer discussed with patient   Return to clinic as needed.     Again, thank you for allowing me to participate in the care of your patient.        Sincerely,        Rebecca Adamson PA-C

## 2018-10-16 ENCOUNTER — MYC MEDICAL ADVICE (OUTPATIENT)
Dept: FAMILY MEDICINE | Facility: CLINIC | Age: 43
End: 2018-10-16

## 2018-10-16 DIAGNOSIS — F31.81 BIPOLAR 2 DISORDER (H): ICD-10-CM

## 2018-10-16 RX ORDER — LEVOMEFOLATE CALCIUM 15 MG
15 TABLET ORAL DAILY
Qty: 90 TABLET | Refills: 3 | Status: SHIPPED | OUTPATIENT
Start: 2018-10-16 | End: 2019-10-29

## 2018-11-15 ENCOUNTER — RADIANT APPOINTMENT (OUTPATIENT)
Dept: MAMMOGRAPHY | Facility: CLINIC | Age: 43
End: 2018-11-15
Attending: FAMILY MEDICINE
Payer: COMMERCIAL

## 2018-11-15 PROCEDURE — 77067 SCR MAMMO BI INCL CAD: CPT | Mod: TC

## 2018-11-26 DIAGNOSIS — F31.81 BIPOLAR 2 DISORDER (H): ICD-10-CM

## 2018-11-26 NOTE — TELEPHONE ENCOUNTER
"Requested Prescriptions   Pending Prescriptions Disp Refills     escitalopram (LEXAPRO) 10 MG tablet [Pharmacy Med Name: ESCITALOPRAM 10 MG TABLET] 90 tablet 0    Last Written Prescription Date:  08/28/2018 #90 x 0  Last filled 08/31/2018  Last office visit: 9/6/2018 VERNA Olmstead   Future Office Visit: None    Sig: TAKE 1 TABLET BY MOUTH ONCE DAILY    SSRIs Protocol Passed    11/26/2018  1:55 AM  PHQ-9 SCORE 9/5/2018   Total Score 5       AKILA-7 SCORE 9/5/2018   Total Score 3              Passed - Recent (12 mo) or future (30 days) visit within the authorizing provider's specialty    Patient had office visit in the last 12 months or has a visit in the next 30 days with authorizing provider or within the authorizing provider's specialty.  See \"Patient Info\" tab in inbasket, or \"Choose Columns\" in Meds & Orders section of the refill encounter.             Passed - Patient is age 18 or older       Passed - No active pregnancy on record       Passed - No positive pregnancy test in last 12 months          "

## 2018-11-27 RX ORDER — ESCITALOPRAM OXALATE 10 MG/1
TABLET ORAL
Qty: 90 TABLET | Refills: 1 | Status: SHIPPED | OUTPATIENT
Start: 2018-11-27 | End: 2023-11-30

## 2018-11-27 NOTE — TELEPHONE ENCOUNTER
Prescription approved per [facility].  Roberta Lopez RN LL/HU

## 2018-12-14 ENCOUNTER — MYC MEDICAL ADVICE (OUTPATIENT)
Dept: FAMILY MEDICINE | Facility: CLINIC | Age: 43
End: 2018-12-14

## 2018-12-14 DIAGNOSIS — N30.10 INTERSTITIAL CYSTITIS: ICD-10-CM

## 2018-12-14 RX ORDER — DIAZEPAM 5 MG
TABLET ORAL
Qty: 30 TABLET | Refills: 0 | Status: SHIPPED | OUTPATIENT
Start: 2018-12-14 | End: 2020-02-11

## 2019-04-09 ENCOUNTER — OFFICE VISIT (OUTPATIENT)
Dept: FAMILY MEDICINE | Facility: CLINIC | Age: 44
End: 2019-04-09
Payer: COMMERCIAL

## 2019-04-09 VITALS
WEIGHT: 115 LBS | OXYGEN SATURATION: 98 % | HEIGHT: 66 IN | DIASTOLIC BLOOD PRESSURE: 90 MMHG | SYSTOLIC BLOOD PRESSURE: 120 MMHG | BODY MASS INDEX: 18.48 KG/M2 | TEMPERATURE: 99 F | HEART RATE: 77 BPM

## 2019-04-09 DIAGNOSIS — J02.9 PHARYNGITIS, UNSPECIFIED ETIOLOGY: Primary | ICD-10-CM

## 2019-04-09 DIAGNOSIS — J02.9 SORE THROAT: ICD-10-CM

## 2019-04-09 LAB
DEPRECATED S PYO AG THROAT QL EIA: NORMAL
SPECIMEN SOURCE: NORMAL

## 2019-04-09 PROCEDURE — 99213 OFFICE O/P EST LOW 20 MIN: CPT | Performed by: FAMILY MEDICINE

## 2019-04-09 PROCEDURE — 87081 CULTURE SCREEN ONLY: CPT | Performed by: FAMILY MEDICINE

## 2019-04-09 PROCEDURE — 87880 STREP A ASSAY W/OPTIC: CPT | Performed by: FAMILY MEDICINE

## 2019-04-09 ASSESSMENT — ENCOUNTER SYMPTOMS
COLOR CHANGE: 0
GASTROINTESTINAL NEGATIVE: 1
APPETITE CHANGE: 0
ALLERGIC/IMMUNOLOGIC NEGATIVE: 1
WEAKNESS: 0
COUGH: 1
HEADACHES: 0
PALPITATIONS: 0
DIZZINESS: 0
CHILLS: 0
SORE THROAT: 1
ACTIVITY CHANGE: 0
SLEEP DISTURBANCE: 0
ENDOCRINE NEGATIVE: 1
AGITATION: 0
EYES NEGATIVE: 1
FATIGUE: 0
WHEEZING: 0
SHORTNESS OF BREATH: 0
NERVOUS/ANXIOUS: 0
HEMATOLOGIC/LYMPHATIC NEGATIVE: 1

## 2019-04-09 ASSESSMENT — MIFFLIN-ST. JEOR: SCORE: 1182.36

## 2019-04-09 NOTE — PROGRESS NOTES
SUBJECTIVE:   Nia Schneider is a 44 year old female who presents to clinic today for the following   health issues:        Chief Complaint   Patient presents with     URI     sore throat x3-4 days, cough        Patient Active Problem List   Diagnosis     Bipolar 2 disorder (H)     Interstitial cystitis     Skin lesion     Past Surgical History:   Procedure Laterality Date     SURGICAL HISTORY OF -  Right 2009    ganglion cyst       Social History     Tobacco Use     Smoking status: Never Smoker     Smokeless tobacco: Never Used   Substance Use Topics     Alcohol use: No     Family History   Problem Relation Age of Onset     Chronic Obstructive Pulmonary Disease Mother      Osteoarthritis Mother      Hypertension Father      Heart Disease Father      Chronic Obstructive Pulmonary Disease Father      Diabetes No family hx of      Colon Cancer No family hx of      Breast Cancer No family hx of          Current Outpatient Medications   Medication Sig Dispense Refill     escitalopram (LEXAPRO) 10 MG tablet TAKE 1 TABLET BY MOUTH ONCE DAILY 90 tablet 1     methylfolate (DEPLIN) 15 MG TABS tablet Take 1 tablet (15 mg) by mouth daily 90 tablet 3     OXcarbazepine (TRILEPTAL) 300 MG tablet 300mg in the morning and 600 mg in the evening        ziprasidone (GEODON) 20 MG capsule Take 20 mg by mouth daily       ALPRAZolam (XANAX) 0.5 MG tablet Take 0.5 mg by mouth daily as needed for anxiety       diazepam (VALIUM) 5 MG tablet Place 5mg vaginally at bedtime as needed for interstitial cystitis (Patient not taking: Reported on 4/9/2019) 30 tablet 0     No Known Allergies    1. Sore throat: Ongoing for the past 3 days.  Exposed to  with similar symptoms.  Noticed a tickle throat in throat which caused dry cough which prevent from a good sleep.  Tried OTC cepachol with minimal improvement.  Concerned about interaction with current medications.  No fevers or chills.  No ear pain.  Mild decreased appetite.  States of  "fatigue.     ROS:  Review of Systems   Constitutional: Negative for activity change, appetite change, chills and fatigue.   HENT: Positive for sore throat.    Eyes: Negative.    Respiratory: Positive for cough. Negative for shortness of breath and wheezing.    Cardiovascular: Negative for chest pain and palpitations.   Gastrointestinal: Negative.    Endocrine: Negative.    Skin: Negative for color change and rash.   Allergic/Immunologic: Negative.    Neurological: Negative for dizziness, weakness and headaches.   Hematological: Negative.    Psychiatric/Behavioral: Negative for agitation and sleep disturbance. The patient is not nervous/anxious.        OBJECTIVE:     /90 (BP Location: Left arm, Cuff Size: Adult Regular)   Pulse 77   Temp 99  F (37.2  C) (Tympanic)   Ht 1.683 m (5' 6.25\")   Wt 52.2 kg (115 lb)   SpO2 98%   BMI 18.42 kg/m    Body mass index is 18.42 kg/m .  Physical Exam   Constitutional: She is oriented to person, place, and time. She appears well-developed and well-nourished. No distress.   HENT:   Head: Normocephalic and atraumatic.   Nose: Nose normal.   Oropharynx: slightly erythematous   Eyes: Conjunctivae and EOM are normal.   Neck: Normal range of motion. No tracheal deviation present.   Cardiovascular: Normal rate, regular rhythm and normal heart sounds.   Pulmonary/Chest: Effort normal and breath sounds normal. No respiratory distress.   Musculoskeletal: Normal range of motion.   Neurological: She is alert and oriented to person, place, and time.   Skin: Skin is warm.   Psychiatric: She has a normal mood and affect. Her behavior is normal.     Rapid Strep: Negative    ASSESSMENT/PLAN:     1. Pharyngitis, unspecified etiology  Most likely viral.  Encourage salt water gargle.  Warm humidifier for cough symptoms.  Hold off on abx at this time.   - acetaminophen-codeine (TYLENOL #3) 300-30 MG tablet; Take 1 tablet by mouth nightly as needed for severe pain (severe cough)  Dispense: 5 " tablet; Refill: 0    2. Sore throat  - Strep, Rapid Screen  - Beta strep group A culture    See Patient Instructions    Joshua Reid, [facility]

## 2019-04-09 NOTE — PATIENT INSTRUCTIONS
Greetings Nia Schneider,    Thank you for allowing [facility] to manage your care.    I sent your prescriptions to your pharmacy.    Encourage salt water gargle for sore throat and warm humidifier for cough symptoms.     If you have any questions or concerns, please feel free to call us at [phone].    Sincerely,    Dr. Reid

## 2019-04-10 LAB
BACTERIA SPEC CULT: NORMAL
SPECIMEN SOURCE: NORMAL

## 2019-04-15 ENCOUNTER — OFFICE VISIT (OUTPATIENT)
Dept: FAMILY MEDICINE | Facility: CLINIC | Age: 44
End: 2019-04-15
Payer: COMMERCIAL

## 2019-04-15 VITALS
WEIGHT: 115 LBS | BODY MASS INDEX: 18.42 KG/M2 | TEMPERATURE: 97.4 F | HEART RATE: 75 BPM | SYSTOLIC BLOOD PRESSURE: 130 MMHG | OXYGEN SATURATION: 98 % | DIASTOLIC BLOOD PRESSURE: 88 MMHG | RESPIRATION RATE: 12 BRPM

## 2019-04-15 DIAGNOSIS — J02.9 PHARYNGITIS, UNSPECIFIED ETIOLOGY: Primary | ICD-10-CM

## 2019-04-15 PROCEDURE — 99213 OFFICE O/P EST LOW 20 MIN: CPT | Performed by: NURSE PRACTITIONER

## 2019-04-15 RX ORDER — AMOXICILLIN 500 MG/1
500 CAPSULE ORAL 2 TIMES DAILY
Qty: 30 CAPSULE | Refills: 0 | Status: SHIPPED | OUTPATIENT
Start: 2019-04-15 | End: 2021-03-08

## 2019-04-15 ASSESSMENT — ENCOUNTER SYMPTOMS
HEADACHES: 0
COUGH: 1
LIGHT-HEADEDNESS: 0
EYE ITCHING: 0
WHEEZING: 0
DIAPHORESIS: 0
SHORTNESS OF BREATH: 0
DIZZINESS: 0
SINUS PRESSURE: 0
NAUSEA: 0
CHILLS: 0
RHINORRHEA: 0
FATIGUE: 1
SORE THROAT: 1
DIARRHEA: 0
FEVER: 0
EYE DISCHARGE: 0
CONSTIPATION: 0

## 2019-04-15 ASSESSMENT — PAIN SCALES - GENERAL: PAINLEVEL: EXTREME PAIN (8)

## 2019-05-03 ENCOUNTER — OFFICE VISIT (OUTPATIENT)
Dept: FAMILY MEDICINE | Facility: CLINIC | Age: 44
End: 2019-05-03
Payer: COMMERCIAL

## 2019-05-03 VITALS
WEIGHT: 118 LBS | SYSTOLIC BLOOD PRESSURE: 125 MMHG | HEIGHT: 66 IN | TEMPERATURE: 97.8 F | DIASTOLIC BLOOD PRESSURE: 84 MMHG | OXYGEN SATURATION: 99 % | BODY MASS INDEX: 18.96 KG/M2 | HEART RATE: 72 BPM | RESPIRATION RATE: 12 BRPM

## 2019-05-03 DIAGNOSIS — J98.01 BRONCHOSPASM: ICD-10-CM

## 2019-05-03 DIAGNOSIS — R09.82 POSTNASAL DRIP: Primary | ICD-10-CM

## 2019-05-03 PROCEDURE — 99213 OFFICE O/P EST LOW 20 MIN: CPT | Performed by: PHYSICIAN ASSISTANT

## 2019-05-03 RX ORDER — FLUTICASONE PROPIONATE 50 MCG
1 SPRAY, SUSPENSION (ML) NASAL DAILY
Qty: 9 ML | Refills: 0 | Status: SHIPPED | OUTPATIENT
Start: 2019-05-03 | End: 2021-03-08

## 2019-05-03 RX ORDER — ALBUTEROL SULFATE 90 UG/1
1-2 AEROSOL, METERED RESPIRATORY (INHALATION) EVERY 6 HOURS PRN
Qty: 8.5 G | Refills: 0 | Status: SHIPPED | OUTPATIENT
Start: 2019-05-03 | End: 2021-03-08

## 2019-05-03 ASSESSMENT — PAIN SCALES - GENERAL: PAINLEVEL: MILD PAIN (2)

## 2019-05-03 ASSESSMENT — MIFFLIN-ST. JEOR: SCORE: 1195.96

## 2019-05-03 NOTE — PATIENT INSTRUCTIONS
For POST NASAL DRIP, start with flonase nasal spray once daily.  If no improvement, may add on an anti-histamine such as allegra or zyrtec.    If throat irritation persists, may consider referral with ENT.     For your lungs/tickle in your throat, this is due to bronchospasm (irritation from previous infection).  Use albuterol as needed.

## 2019-05-03 NOTE — PROGRESS NOTES
SUBJECTIVE:   Nia Schneider is a 44 year old female who presents to clinic today for the following   health issues:      ENT Symptoms             Symptoms: cc Present Absent Comment   Fever/Chills   x    Fatigue  x     Muscle Aches   x    Eye Irritation   x    Sneezing   x    Nasal Lars/Drg   x    Sinus Pressure/Pain   x    Loss of smell   x    Dental pain   x    Sore Throat  x     Swollen Glands   x    Ear Pain/Fullness   x    Cough  x     Wheeze   x    Chest Pain  x     Shortness of breath  x     Rash   x    Other   x      Symptom duration:  1 month   Symptom severity:  moderate   Treatments tried:  none   Contacts:  none     She was started on amoxicillin about 10 days ago and this helped with the severe sore throat.  But still with irritation, especially in the lungs.  Lungs feel irritated, especially with expiration.    She has some cough, seems to be from a tickle in her throat.  She does have some POST NASAL DRIP and is constantly clearing her throat.    No runny nose, sneezing or itchy eyes.         Additional history: as documented    Reviewed  and updated as needed this visit by clinical staff         Reviewed and updated as needed this visit by Provider         Patient Active Problem List   Diagnosis     Bipolar 2 disorder (H)     Interstitial cystitis     Skin lesion     Past Surgical History:   Procedure Laterality Date     SURGICAL HISTORY OF -  Right 2009    ganglion cyst       Social History     Tobacco Use     Smoking status: Never Smoker     Smokeless tobacco: Never Used   Substance Use Topics     Alcohol use: No     Family History   Problem Relation Age of Onset     Chronic Obstructive Pulmonary Disease Mother      Osteoarthritis Mother      Hypertension Father      Heart Disease Father      Chronic Obstructive Pulmonary Disease Father      Diabetes No family hx of      Colon Cancer No family hx of      Breast Cancer No family hx of          Current Outpatient Medications   Medication Sig  "Dispense Refill     albuterol (PROAIR HFA/PROVENTIL HFA/VENTOLIN HFA) 108 (90 Base) MCG/ACT inhaler Inhale 1-2 puffs into the lungs every 6 hours as needed for shortness of breath / dyspnea or wheezing 8.5 g 0     ALPRAZolam (XANAX) 0.5 MG tablet Take 0.5 mg by mouth daily as needed for anxiety       diazepam (VALIUM) 5 MG tablet Place 5mg vaginally at bedtime as needed for interstitial cystitis 30 tablet 0     escitalopram (LEXAPRO) 10 MG tablet TAKE 1 TABLET BY MOUTH ONCE DAILY 90 tablet 1     fluticasone (FLONASE) 50 MCG/ACT nasal spray Spray 1 spray into both nostrils daily 9 mL 0     methylfolate (DEPLIN) 15 MG TABS tablet Take 1 tablet (15 mg) by mouth daily 90 tablet 3     OXcarbazepine (TRILEPTAL) 300 MG tablet 300mg in the morning and 600 mg in the evening        ziprasidone (GEODON) 20 MG capsule Take 20 mg by mouth daily       amoxicillin (AMOXIL) 500 MG capsule Take 1 capsule (500 mg) by mouth 2 times daily (Patient not taking: Reported on 5/3/2019) 30 capsule 0     BP Readings from Last 3 Encounters:   05/03/19 125/84   04/15/19 130/88   04/09/19 120/90    Wt Readings from Last 3 Encounters:   05/03/19 53.5 kg (118 lb)   04/15/19 52.2 kg (115 lb)   04/09/19 52.2 kg (115 lb)                    ROS:  Constitutional, HEENT, cardiovascular, pulmonary, gi and gu systems are negative, except as otherwise noted.    OBJECTIVE:     /84 (BP Location: Right arm, Patient Position: Chair, Cuff Size: Adult Regular)   Pulse 72   Temp 97.8  F (36.6  C) (Tympanic)   Resp 12   Ht 1.683 m (5' 6.25\")   Wt 53.5 kg (118 lb)   LMP  (LMP Unknown)   SpO2 99%   Breastfeeding? No   BMI 18.90 kg/m    Body mass index is 18.9 kg/m .  GENERAL: healthy, alert and no distress  EYES: Eyes grossly normal to inspection, PERRL and conjunctivae and sclerae normal  HENT: ear canals and TM's normal, nose and mouth without ulcers or lesions  NECK: no adenopathy, no asymmetry, masses, or scars and thyroid normal to " palpation  RESP: lungs clear to auscultation - no rales, rhonchi or wheezes  CV: regular rate and rhythm, normal S1 S2, no S3 or S4, no murmur, click or rub, no peripheral edema and peripheral pulses strong    Diagnostic Test Results:  none     ASSESSMENT/PLAN:       1. Postnasal drip  For POST NASAL DRIP, start with flonase nasal spray once daily.  If no improvement, may add on an anti-histamine such as allegra or zyrtec.    If throat irritation persists, may consider referral with ENT.  - fluticasone (FLONASE) 50 MCG/ACT nasal spray; Spray 1 spray into both nostrils daily  Dispense: 9 mL; Refill: 0    2. Bronchospasm  For your lungs/tickle in your throat, this is due to bronchospasm (irritation from previous infection).  Use albuterol as needed.     - albuterol (PROAIR HFA/PROVENTIL HFA/VENTOLIN HFA) 108 (90 Base) MCG/ACT inhaler; Inhale 1-2 puffs into the lungs every 6 hours as needed for shortness of breath / dyspnea or wheezing  Dispense: 8.5 g; Refill: 0    CONSULTATION/REFERRAL to ENT if no improvement    Vivian Tavares PA-C  [facility]

## 2019-05-25 DIAGNOSIS — R09.82 POSTNASAL DRIP: ICD-10-CM

## 2019-05-28 RX ORDER — FLUTICASONE PROPIONATE 50 MCG
SPRAY, SUSPENSION (ML) NASAL
Qty: 16 ML | Refills: 0 | OUTPATIENT
Start: 2019-05-28

## 2019-05-28 NOTE — TELEPHONE ENCOUNTER
"Requested Prescriptions   Pending Prescriptions Disp Refills     fluticasone (FLONASE) 50 MCG/ACT nasal spray [Pharmacy Med Name: FLUTICASONE PROP 50 MCG SPRAY] 16 mL 0     Sig: INSTILL 1 SPRAY INTO BOTH NOSTRILS DAILY  Last Written Prescription Date:  05/03/2019 #9ml x 0  Last filled 05/03/2019  Last office visit: 5/3/2019 VERNA Tavares   Future Office Visit:  None         Inhaled Steroids Protocol Passed - 5/25/2019  7:34 AM        Passed - Patient is age 12 or older        Passed - Recent (12 mo) or future (30 days) visit within the authorizing provider's specialty     Patient had office visit in the last 12 months or has a visit in the next 30 days with authorizing provider or within the authorizing provider's specialty.  See \"Patient Info\" tab in inbasket, or \"Choose Columns\" in Meds & Orders section of the refill encounter.              Passed - Medication is active on med list          "

## 2019-05-28 NOTE — TELEPHONE ENCOUNTER
"Called pt to see how she's doing and she said she's \"better now,\" and she said she told [facility] she did not need this filled, however we got a request anyway.   Says \"I'm all healed.\" and denies needing anything from us at this time.     Aleyda SON RN      "

## 2019-10-29 DIAGNOSIS — F31.81 BIPOLAR 2 DISORDER (H): ICD-10-CM

## 2019-10-29 NOTE — TELEPHONE ENCOUNTER
Requested Prescriptions   Pending Prescriptions Disp Refills     methylfolate (DEPLIN) 15 MG TABS tablet [Pharmacy Med Name: L-METHYLFOLATE 15 MG CAPLET] 90 tablet 3     Sig: TAKE 1 TABLET BY MOUTH ONCE DAILY  Last Written Prescription Date:  10/16/2018 #90 x 3  Last filled 07/31/2019 #90 x 3  Last office visit: 5/3/2019 VERNA Tavares   Future Office Visit:  None         There is no refill protocol information for this order

## 2019-10-29 NOTE — TELEPHONE ENCOUNTER
Routing refill request to provider for review/approval because:  Drug not on the [facility] refill protocol     Grace King RN

## 2019-10-31 RX ORDER — LEVOMEFOLATE CALCIUM 15 MG
TABLET ORAL
Qty: 90 TABLET | Refills: 3 | Status: SHIPPED | OUTPATIENT
Start: 2019-10-31 | End: 2020-11-02

## 2019-11-09 ENCOUNTER — HEALTH MAINTENANCE LETTER (OUTPATIENT)
Age: 44
End: 2019-11-09

## 2020-02-11 ENCOUNTER — MYC REFILL (OUTPATIENT)
Dept: FAMILY MEDICINE | Facility: CLINIC | Age: 45
End: 2020-02-11

## 2020-02-11 DIAGNOSIS — N30.10 INTERSTITIAL CYSTITIS: ICD-10-CM

## 2020-02-11 NOTE — TELEPHONE ENCOUNTER
Routing refill request to provider for review/approval because:  Drug not on the [facility], [facility] or [facility] refill protocol or controlled substance  Janel Iverson RN

## 2020-02-13 RX ORDER — DIAZEPAM 5 MG
TABLET ORAL
Qty: 30 TABLET | Refills: 0 | Status: SHIPPED | OUTPATIENT
Start: 2020-02-13 | End: 2022-11-29

## 2020-08-05 ENCOUNTER — TRANSFERRED RECORDS (OUTPATIENT)
Dept: HEALTH INFORMATION MANAGEMENT | Facility: CLINIC | Age: 45
End: 2020-08-05

## 2020-10-29 DIAGNOSIS — F31.81 BIPOLAR 2 DISORDER (H): ICD-10-CM

## 2020-10-30 NOTE — TELEPHONE ENCOUNTER
Routing refill request to provider for review/approval because:  Drug not on the [facility] refill protocol     Shanique Santacruz RN

## 2020-11-02 RX ORDER — LEVOMEFOLATE CALCIUM 15 MG
TABLET ORAL
Qty: 30 TABLET | Refills: 0 | Status: SHIPPED | OUTPATIENT
Start: 2020-11-02 | End: 2021-03-08

## 2020-11-02 NOTE — TELEPHONE ENCOUNTER
Pt states she does not need it and it was the pharmacy that actually automated requested it.     Jo Ann Bassett, [place]

## 2020-11-02 NOTE — TELEPHONE ENCOUNTER
Please call pt.  I have not seen her in over 2 years.  Med filled for 30 days.,  Please ask her to schedule with me or the provider of her choice for ongoing refills and management  Radha Olmstead, DO

## 2020-12-06 ENCOUNTER — HEALTH MAINTENANCE LETTER (OUTPATIENT)
Age: 45
End: 2020-12-06

## 2021-02-20 ENCOUNTER — HEALTH MAINTENANCE LETTER (OUTPATIENT)
Age: 46
End: 2021-02-20

## 2021-03-08 ENCOUNTER — OFFICE VISIT (OUTPATIENT)
Dept: FAMILY MEDICINE | Facility: CLINIC | Age: 46
End: 2021-03-08
Payer: COMMERCIAL

## 2021-03-08 ENCOUNTER — TRANSFERRED RECORDS (OUTPATIENT)
Dept: HEALTH INFORMATION MANAGEMENT | Facility: CLINIC | Age: 46
End: 2021-03-08

## 2021-03-08 VITALS
BODY MASS INDEX: 19.37 KG/M2 | DIASTOLIC BLOOD PRESSURE: 74 MMHG | HEIGHT: 66 IN | RESPIRATION RATE: 15 BRPM | SYSTOLIC BLOOD PRESSURE: 121 MMHG | WEIGHT: 120.5 LBS | TEMPERATURE: 98.5 F | HEART RATE: 62 BPM | OXYGEN SATURATION: 100 %

## 2021-03-08 DIAGNOSIS — G43.009 MIGRAINE WITHOUT AURA AND WITHOUT STATUS MIGRAINOSUS, NOT INTRACTABLE: Primary | ICD-10-CM

## 2021-03-08 PROCEDURE — 99213 OFFICE O/P EST LOW 20 MIN: CPT | Performed by: FAMILY MEDICINE

## 2021-03-08 RX ORDER — SUMATRIPTAN 50 MG/1
50 TABLET, FILM COATED ORAL
Qty: 9 TABLET | Refills: 0 | Status: SHIPPED | OUTPATIENT
Start: 2021-03-08 | End: 2021-04-12 | Stop reason: SINTOL

## 2021-03-08 ASSESSMENT — MIFFLIN-ST. JEOR: SCORE: 1197.3

## 2021-03-08 NOTE — PATIENT INSTRUCTIONS
I suspect the headaches you are having are migraine headaches.  We'll try the imitrex as we discussed for your headaches.  This works best when taken right at the onset of your headache.  Please let me know if you have any difficulty with this medication.  We'll plan on following up on effectiveness at your upcoming physical

## 2021-03-08 NOTE — PROGRESS NOTES
A/p:      ICD-10-CM    1. Migraine without aura and without status migrainosus, not intractable  G43.009 SUMAtriptan (IMITREX) 50 MG tablet     Reviewed with pt that current headaches sound migrainous in nature.  They have been present for years but worse over the last 2 years.  Relatively stable frequency and intensity over the last 2 years.  No red flag symptoms.     With HA frequency 2-4 per month would recommend beginning with abortive therapy.  Reviewed need to avoid frequent use of medication to abort HA due to risk of medication related HA.  Pt verbalized understanding.    Reviewed trial of triptan.  Discussed possible side effects.  Plan trial of imitrex as ordered.    Pt long overdue for CPE and has multiple HM due, will schedule CPE in April and plan to f/u HA at that time as well.    Call/message for any problem with medication prior to that.    Patient Instructions   I suspect the headaches you are having are migraine headaches.  We'll try the imitrex as we discussed for your headaches.  This works best when taken right at the onset of your headache.  Please let me know if you have any difficulty with this medication.  We'll plan on following up on effectiveness at your upcoming physical                Subjective   Nia is a 46 year old who presents for the following health issues     HPI       Headache  Onset/Duration: Off and on but have been getting progressivelty getting worse over the last 2 year  Description  Location: unilateral in the left temporal area, states she does clench her jaw at times  Character: sharp, throbbing pain  Frequency:  2-4 HA per month.  At this frequency for a couple of years.  Wake with headaches: no  Able to do daily activities when headache present: no   Intensity:  moderate  Progression of Symptoms:  worsening  Accompanying signs and symptoms:  Stiff neck: YES  Neck or upper back pain: YES - sometimes has neck ache that causes HA but not consistently  Sinus or  URI symptoms YES- congestion  Fever: no  Nausea or vomiting: YES, nausea  Dizziness: no  Numbness/tingling: no  Weakness: no  Visual changes: none  History  Head trauma: No  Family history of migraines: YES, 2 brothers do get headaches  Daily pain medication use: no  Previous tests for headaches: no  Neurologist evaluation: no  Precipitating or Alleviating factors (light/sound/sleep/caffeine): Has to be in dark room   Therapies tried and outcome: Excedrin              Outcome - not effective  Frequent/daily pain medication use: no    Feels these headaches have been going on for years, likely started in her 30's.  In the past they were less frequent and would resolve with over the counter medication.  Feels they have been worse in the last few years.      Usually fees the HA coming on, feel like it starts with some pain/congestion at the bridge of her nose.  Pain increases gradually.  Feels pain is sharp, like a knife and throbbing.  Pain is over the L eye and around the L side of the head.  At times can be so bad that she just needs to lay down.  Generally lasts 2 days.  Feels better to lay down but HA is the same when she gets back up.    Feels like if she were to exercise intensively it might be worse but is able to walk her dog and do normal activities without worsening.  No worsening with valsalva.  Does not wake her at night but can cause trouble falling asleep.  HA comes on during the day.  Often when she wakes she will feel like CRAWFORD is gone only to have it gradually return as the day progresses.    Does have some nausea associated, did have some vomiting with these in the distant past but not recently.  Can be light sensitive at times with bad headaches, not all the time.  Does help to have room darkened.  No sound sensitivity with the exception of her dog's bark.    Feels like stress can bring headaches on.   travels for work and she will often have more trouble with headache when he is gone.      Will  "take evans seltzer (for the aspirin) and 2 tylenol at headache onset.  This does not help.  Cannot take NSAID's due to her intestitial cystitis.    No numbness/tingling/weakness noted.  No speech or balance changes.  CR[place]FORD has been of the same character since onset years ago, only changing in frequency and intensity.  Just feel fed up, wants to not have this frequent pain any longer.      Review of Systems   Constitutional, HEENT, cardiovascular, pulmonary, gi and gu systems are negative, except as otherwise noted.    Ongoing bladder pain form IC, looking for a urologist now.      Objective    /74   Pulse 62   Temp 98.5  F (36.9  C) (Tympanic)   Resp 15   Ht 1.683 m (5' 6.25\")   Wt 54.7 kg (120 lb 8 oz)   SpO2 100%   BMI 19.30 kg/m    Body mass index is 19.3 kg/m .  Physical Exam   PE:  VS as above   Gen:  WN/WD/WH female in NAD   Neuro:  Normal gait and cognition   Psych: Alert and oriented times 3; coherent speech, normal   rate and volume, able to articulate logical thoughts, able   to abstract reason, no tangential thoughts, no hallucinations   or delusions  Her affect is mildly flat        Epic reviewed            "

## 2021-03-09 ENCOUNTER — MYC MEDICAL ADVICE (OUTPATIENT)
Dept: FAMILY MEDICINE | Facility: CLINIC | Age: 46
End: 2021-03-09

## 2021-03-09 DIAGNOSIS — G43.009 MIGRAINE WITHOUT AURA AND WITHOUT STATUS MIGRAINOSUS, NOT INTRACTABLE: Primary | ICD-10-CM

## 2021-04-08 ASSESSMENT — ENCOUNTER SYMPTOMS
CONSTIPATION: 0
ARTHRALGIAS: 0
COUGH: 0
PARESTHESIAS: 0
JOINT SWELLING: 0
EYE PAIN: 0
CHILLS: 0
PALPITATIONS: 0
HEARTBURN: 0
FEVER: 0
DIARRHEA: 0
NAUSEA: 0
DIZZINESS: 0
BREAST MASS: 0
DYSURIA: 0
MYALGIAS: 0
NERVOUS/ANXIOUS: 0
HEMATOCHEZIA: 0
SHORTNESS OF BREATH: 0
ABDOMINAL PAIN: 0
HEMATURIA: 0
WEAKNESS: 0
FREQUENCY: 0
SORE THROAT: 0
HEADACHES: 0

## 2021-04-12 ENCOUNTER — OFFICE VISIT (OUTPATIENT)
Dept: FAMILY MEDICINE | Facility: CLINIC | Age: 46
End: 2021-04-12
Payer: COMMERCIAL

## 2021-04-12 VITALS
DIASTOLIC BLOOD PRESSURE: 78 MMHG | HEIGHT: 67 IN | WEIGHT: 117.9 LBS | TEMPERATURE: 98.3 F | SYSTOLIC BLOOD PRESSURE: 120 MMHG | BODY MASS INDEX: 18.51 KG/M2 | HEART RATE: 68 BPM

## 2021-04-12 DIAGNOSIS — E87.1 HYPONATREMIA: Primary | ICD-10-CM

## 2021-04-12 DIAGNOSIS — Z00.00 ROUTINE GENERAL MEDICAL EXAMINATION AT A HEALTH CARE FACILITY: Primary | ICD-10-CM

## 2021-04-12 DIAGNOSIS — E55.9 VITAMIN D DEFICIENCY: ICD-10-CM

## 2021-04-12 DIAGNOSIS — Z13.1 SCREENING FOR DIABETES MELLITUS: ICD-10-CM

## 2021-04-12 DIAGNOSIS — F31.81 BIPOLAR 2 DISORDER (H): ICD-10-CM

## 2021-04-12 DIAGNOSIS — Z11.4 SCREENING FOR HIV (HUMAN IMMUNODEFICIENCY VIRUS): ICD-10-CM

## 2021-04-12 DIAGNOSIS — Z11.59 ENCOUNTER FOR HEPATITIS C SCREENING TEST FOR LOW RISK PATIENT: ICD-10-CM

## 2021-04-12 DIAGNOSIS — Z13.6 CARDIOVASCULAR SCREENING; LDL GOAL LESS THAN 130: ICD-10-CM

## 2021-04-12 DIAGNOSIS — Z12.31 ENCOUNTER FOR SCREENING MAMMOGRAM FOR BREAST CANCER: ICD-10-CM

## 2021-04-12 LAB
ALBUMIN SERPL-MCNC: 4.3 G/DL (ref 3.4–5)
ALP SERPL-CCNC: 61 U/L (ref 40–150)
ALT SERPL W P-5'-P-CCNC: 18 U/L (ref 0–50)
ANION GAP SERPL CALCULATED.3IONS-SCNC: 5 MMOL/L (ref 3–14)
AST SERPL W P-5'-P-CCNC: 14 U/L (ref 0–45)
BILIRUB SERPL-MCNC: 0.3 MG/DL (ref 0.2–1.3)
BUN SERPL-MCNC: 11 MG/DL (ref 7–30)
CALCIUM SERPL-MCNC: 9.5 MG/DL (ref 8.5–10.1)
CHLORIDE SERPL-SCNC: 95 MMOL/L (ref 94–109)
CHOLEST SERPL-MCNC: 224 MG/DL
CO2 SERPL-SCNC: 29 MMOL/L (ref 20–32)
CREAT SERPL-MCNC: 0.87 MG/DL (ref 0.52–1.04)
DEPRECATED CALCIDIOL+CALCIFEROL SERPL-MC: 35 UG/L (ref 20–75)
GFR SERPL CREATININE-BSD FRML MDRD: 79 ML/MIN/{1.73_M2}
GLUCOSE SERPL-MCNC: 90 MG/DL (ref 70–99)
HCV AB SERPL QL IA: NONREACTIVE
HDLC SERPL-MCNC: 117 MG/DL
HIV 1+2 AB+HIV1 P24 AG SERPL QL IA: NONREACTIVE
LDLC SERPL CALC-MCNC: 90 MG/DL
NONHDLC SERPL-MCNC: 107 MG/DL
POTASSIUM SERPL-SCNC: 4.2 MMOL/L (ref 3.4–5.3)
PROT SERPL-MCNC: 8.3 G/DL (ref 6.8–8.8)
SODIUM SERPL-SCNC: 129 MMOL/L (ref 133–144)
TRIGL SERPL-MCNC: 87 MG/DL

## 2021-04-12 PROCEDURE — 80061 LIPID PANEL: CPT | Performed by: FAMILY MEDICINE

## 2021-04-12 PROCEDURE — 90471 IMMUNIZATION ADMIN: CPT | Performed by: FAMILY MEDICINE

## 2021-04-12 PROCEDURE — 99396 PREV VISIT EST AGE 40-64: CPT | Mod: 25 | Performed by: FAMILY MEDICINE

## 2021-04-12 PROCEDURE — 82306 VITAMIN D 25 HYDROXY: CPT | Performed by: FAMILY MEDICINE

## 2021-04-12 PROCEDURE — 90715 TDAP VACCINE 7 YRS/> IM: CPT | Performed by: FAMILY MEDICINE

## 2021-04-12 PROCEDURE — 36415 COLL VENOUS BLD VENIPUNCTURE: CPT | Performed by: FAMILY MEDICINE

## 2021-04-12 PROCEDURE — 86803 HEPATITIS C AB TEST: CPT | Performed by: FAMILY MEDICINE

## 2021-04-12 PROCEDURE — 80053 COMPREHEN METABOLIC PANEL: CPT | Performed by: FAMILY MEDICINE

## 2021-04-12 PROCEDURE — 87389 HIV-1 AG W/HIV-1&-2 AB AG IA: CPT | Performed by: FAMILY MEDICINE

## 2021-04-12 ASSESSMENT — ENCOUNTER SYMPTOMS
SORE THROAT: 0
HEARTBURN: 0
COUGH: 0
SHORTNESS OF BREATH: 0
BREAST MASS: 0
FEVER: 0
NAUSEA: 0
HEMATURIA: 0
NERVOUS/ANXIOUS: 0
PARESTHESIAS: 0
WEAKNESS: 0
PALPITATIONS: 0
FREQUENCY: 0
DYSURIA: 0
DIARRHEA: 0
HEMATOCHEZIA: 0
ABDOMINAL PAIN: 0
HEADACHES: 0
DIZZINESS: 0
CONSTIPATION: 0
CHILLS: 0
ARTHRALGIAS: 0
MYALGIAS: 0
JOINT SWELLING: 0
EYE PAIN: 0

## 2021-04-12 ASSESSMENT — MIFFLIN-ST. JEOR: SCORE: 1189.48

## 2021-04-12 NOTE — PROGRESS NOTES
SUBJECTIVE:   CC: Nia Schneider is an 46 year old woman who presents for preventive health visit.       Patient has been advised of split billing requirements and indicates understanding: Yes     Healthy Habits:     Getting at least 3 servings of Calcium per day:  Yes    Bi-annual eye exam:  NO    Dental care twice a year:  Yes    Sleep apnea or symptoms of sleep apnea:  None    Diet:  Regular (no restrictions)    Frequency of exercise:  6-7 days/week    Duration of exercise:  45-60 minutes    Taking medications regularly:  No    Barriers to taking medications:  None    Medication side effects:  None    PHQ-2 Total Score: 0    Additional concerns today:  No    Has just started some Viactiv.  Has a h/o vitamin D deficiency and did not follow up.  Requesting testing today    Sees psychiatry for management of bipolar, feels things have been stable.  Not doing labs through her provider.  Plan lipids and DM screening today    Has appointment with [facility] on Thursday to address options for her migraines.      Today's PHQ-2 Score:   PHQ-2 ( 1999 Pfizer) 4/8/2021   Q1: Little interest or pleasure in doing things 0   Q2: Feeling down, depressed or hopeless 0   PHQ-2 Score 0   Q1: Little interest or pleasure in doing things Not at all   Q2: Feeling down, depressed or hopeless Not at all   PHQ-2 Score 0       Abuse: Current or Past (Physical, Sexual or Emotional) - No  Do you feel safe in your environment? Yes    Have you ever done Advance Care Planning? (For example, a Health Directive, POLST, or a discussion with a medical provider or your loved ones about your wishes): No, advance care planning information given to patient to review.  Patient plans to discuss their wishes with loved ones or provider.      Social History     Tobacco Use     Smoking status: Never Smoker     Smokeless tobacco: Never Used   Substance Use Topics     Alcohol use: No         Alcohol Use 4/8/2021   Prescreen: >3 drinks/day or >7  drinks/week? No       Reviewed orders with patient.  Reviewed health maintenance and updated orders accordingly - Yes    Breast Cancer Screening:    Breast CA Risk Assessment (FHS-7) 4/8/2021   Do you have a family history of breast, colon, or ovarian cancer? No / Unknown         Mammogram Screening: Recommended annual mammography  Pertinent mammograms are reviewed under the imaging tab.    History of abnormal Pap smear: NO - age 30-65 PAP every 5 years with negative HPV co-testing recommended  PAP / HPV Latest Ref Rng & Units 9/6/2018   PAP - NIL   HPV 16 DNA NEG:Negative Negative   HPV 18 DNA NEG:Negative Negative   OTHER HR HPV NEG:Negative Negative     Reviewed and updated as needed this visit by clinical staff  Tobacco  Allergies  Meds   Med Hx  Surg Hx  Fam Hx  Soc Hx        Reviewed and updated as needed this visit by Provider  Tobacco   Meds   Med Hx  Surg Hx  Fam Hx  Soc Hx       History reviewed. No pertinent past medical history.   Past Surgical History:   Procedure Laterality Date     SURGICAL HISTORY OF -  Right 2009    ganglion cyst       Review of Systems   Constitutional: Negative for chills and fever.   HENT: Negative for congestion, ear pain, hearing loss and sore throat.    Eyes: Negative for pain and visual disturbance.   Respiratory: Negative for cough and shortness of breath.    Cardiovascular: Negative for chest pain, palpitations and peripheral edema.   Gastrointestinal: Negative for abdominal pain, constipation, diarrhea, heartburn, hematochezia and nausea.   Breasts:  Negative for tenderness, breast mass and discharge.   Genitourinary: Negative for dysuria, frequency, genital sores, hematuria, pelvic pain, urgency, vaginal bleeding and vaginal discharge.   Musculoskeletal: Negative for arthralgias, joint swelling and myalgias.   Skin: Negative for rash.   Neurological: Negative for dizziness, weakness, headaches and paresthesias.   Psychiatric/Behavioral: Negative for mood  "changes. The patient is not nervous/anxious.           OBJECTIVE:   /78   Pulse 68   Temp 98.3  F (36.8  C) (Tympanic)   Ht 1.689 m (5' 6.5\")   Wt 53.5 kg (117 lb 14.4 oz)   Breastfeeding No   BMI 18.74 kg/m    Physical Exam  GENERAL: healthy, alert and no distress  EYES: Eyes grossly normal to inspection, PERRL and conjunctivae and sclerae normal  HENT: normal cephalic/atraumatic and ear canals and TM's normal  NECK: no adenopathy, no asymmetry, masses, or scars and thyroid normal to palpation  RESP: lungs clear to auscultation - no rales, rhonchi or wheezes  BREAST: declined  CV: regular rate and rhythm, normal S1 S2, no S3 or S4, no murmur, click or rub, no peripheral edema and peripheral pulses strong  ABDOMEN: soft, nontender, no hepatosplenomegaly, no masses and bowel sounds normal  MS: no gross musculoskeletal defects noted, no edema  NEURO: Normal strength and tone, mentation intact and speech normal  PSYCH: mentation appears normal, affect normal/bright    Diagnostic Test Results:  Labs reviewed in Epic    ASSESSMENT/PLAN:       ICD-10-CM    1. Routine general medical examination at a health care facility  Z00.00    2. Vitamin D deficiency  E55.9 Vitamin D Deficiency   3. Screening for diabetes mellitus  Z13.1 Comprehensive metabolic panel (BMP + Alb, Alk Phos, ALT, AST, Total. Bili, TP)   4. Bipolar 2 disorder (H)  F31.81 Comprehensive metabolic panel (BMP + Alb, Alk Phos, ALT, AST, Total. Bili, TP)   5. CARDIOVASCULAR SCREENING; LDL GOAL LESS THAN 130  Z13.6 Lipid panel reflex to direct LDL Fasting   6. Screening for HIV (human immunodeficiency virus)  Z11.4 HIV Antigen Antibody Combo   7. Encounter for hepatitis C screening test for low risk patient  Z11.59 **Hepatitis C Screen Reflex to RNA FUTURE anytime   8. Encounter for screening mammogram for breast cancer  Z12.31 *MA Screening Digital Bilateral       Patient has been advised of split billing requirements and indicates understanding: " "Yes  COUNSELING:  Reviewed preventive health counseling, as reflected in patient instructions    Estimated body mass index is 18.74 kg/m  as calculated from the following:    Height as of this encounter: 1.689 m (5' 6.5\").    Weight as of this encounter: 53.5 kg (117 lb 14.4 oz).        She reports that she has never smoked. She has never used smokeless tobacco.      Counseling Resources:  ATP IV Guidelines  Pooled Cohorts Equation Calculator  Breast Cancer Risk Calculator  BRCA-Related Cancer Risk Assessment: FHS-7 Tool  FRAX Risk Assessment  ICSI Preventive Guidelines  Dietary Guidelines for Americans, 2010  USDA's MyPlate  ASA Prophylaxis  Lung CA Screening    Radha Olmstead DO  [facility]  "

## 2021-04-12 NOTE — PATIENT INSTRUCTIONS
You can call [phone] to schedule your mammogram.  I will let you know lab results when I see them    Please request that [facility] send us the report from your visit      Preventive Health Recommendations  Female Ages 40 to 49    Yearly exam:     See your health care provider every year in order to  1. Review health changes.   2. Discuss preventive care.    3. Review your medicines if your doctor prescribed any.      Get a Pap test every three years (unless you have an abnormal result and your provider advises testing more often).      If you get Pap tests with HPV test, you only need to test every 5 years, unless you have an abnormal result. You do not need a Pap test if your uterus was removed (hysterectomy) and you have not had cancer.      You should be tested each year for STDs (sexually transmitted diseases), if you're at risk.     Ask your doctor if you should have a mammogram.      Have a colonoscopy (test for colon cancer) if someone in your family has had colon cancer or polyps before age 50.       Have a cholesterol test every 5 years.       Have a diabetes test (fasting glucose) after age 45. If you are at risk for diabetes, you should have this test every 3 years.    Shots: Get a flu shot each year. Get a tetanus shot every 10 years.     Nutrition:     Eat at least 5 servings of fruits and vegetables each day.    Eat whole-grain bread, whole-wheat pasta and brown rice instead of white grains and rice.    Get adequate Calcium and Vitamin D.      Lifestyle    Exercise at least 150 minutes a week (an average of 30 minutes a day, 5 days a week). This will help you control your weight and prevent disease.    Limit alcohol to one drink per day.    No smoking.     Wear sunscreen to prevent skin cancer.    See your dentist every six months for an exam and cleaning.

## 2021-04-12 NOTE — NURSING NOTE
"Initial /78   Pulse 68   Temp 98.3  F (36.8  C) (Tympanic)   Ht 1.689 m (5' 6.5\")   Wt 53.5 kg (117 lb 14.4 oz)   Breastfeeding No   BMI 18.74 kg/m   Estimated body mass index is 18.74 kg/m  as calculated from the following:    Height as of this encounter: 1.689 m (5' 6.5\").    Weight as of this encounter: 53.5 kg (117 lb 14.4 oz). .      "

## 2021-04-13 ENCOUNTER — MYC MEDICAL ADVICE (OUTPATIENT)
Dept: FAMILY MEDICINE | Facility: CLINIC | Age: 46
End: 2021-04-13

## 2021-04-13 DIAGNOSIS — E87.1 HYPONATREMIA: ICD-10-CM

## 2021-04-13 LAB
ANION GAP SERPL CALCULATED.3IONS-SCNC: 5 MMOL/L (ref 3–14)
BUN SERPL-MCNC: 18 MG/DL (ref 7–30)
CALCIUM SERPL-MCNC: 9.6 MG/DL (ref 8.5–10.1)
CHLORIDE SERPL-SCNC: 102 MMOL/L (ref 94–109)
CO2 SERPL-SCNC: 29 MMOL/L (ref 20–32)
CREAT SERPL-MCNC: 0.9 MG/DL (ref 0.52–1.04)
GFR SERPL CREATININE-BSD FRML MDRD: 76 ML/MIN/{1.73_M2}
GLUCOSE SERPL-MCNC: 93 MG/DL (ref 70–99)
OSMOLALITY UR: 685 MMOL/KG (ref 100–1200)
POTASSIUM SERPL-SCNC: 4.2 MMOL/L (ref 3.4–5.3)
SODIUM SERPL-SCNC: 136 MMOL/L (ref 133–144)
SODIUM UR-SCNC: 84 MMOL/L

## 2021-04-13 PROCEDURE — 36415 COLL VENOUS BLD VENIPUNCTURE: CPT | Performed by: FAMILY MEDICINE

## 2021-04-13 PROCEDURE — 83935 ASSAY OF URINE OSMOLALITY: CPT | Mod: 90 | Performed by: FAMILY MEDICINE

## 2021-04-13 PROCEDURE — 80048 BASIC METABOLIC PNL TOTAL CA: CPT | Performed by: FAMILY MEDICINE

## 2021-04-13 PROCEDURE — 99000 SPECIMEN HANDLING OFFICE-LAB: CPT | Performed by: FAMILY MEDICINE

## 2021-04-13 PROCEDURE — 84300 ASSAY OF URINE SODIUM: CPT | Performed by: FAMILY MEDICINE

## 2021-04-15 ENCOUNTER — TRANSFERRED RECORDS (OUTPATIENT)
Dept: HEALTH INFORMATION MANAGEMENT | Facility: CLINIC | Age: 46
End: 2021-04-15

## 2021-04-18 ENCOUNTER — TRANSFERRED RECORDS (OUTPATIENT)
Dept: HEALTH INFORMATION MANAGEMENT | Facility: CLINIC | Age: 46
End: 2021-04-18

## 2021-05-10 ENCOUNTER — TRANSFERRED RECORDS (OUTPATIENT)
Dept: HEALTH INFORMATION MANAGEMENT | Facility: CLINIC | Age: 46
End: 2021-05-10

## 2021-06-07 ENCOUNTER — OFFICE VISIT (OUTPATIENT)
Dept: UROLOGY | Facility: CLINIC | Age: 46
End: 2021-06-07
Payer: COMMERCIAL

## 2021-06-07 VITALS — RESPIRATION RATE: 18 BRPM | OXYGEN SATURATION: 99 % | HEART RATE: 60 BPM

## 2021-06-07 DIAGNOSIS — R39.89 BLADDER PAIN: Primary | ICD-10-CM

## 2021-06-07 LAB
ALBUMIN UR-MCNC: NEGATIVE MG/DL
AMORPH CRY #/AREA URNS HPF: ABNORMAL /HPF
APPEARANCE UR: ABNORMAL
BACTERIA #/AREA URNS HPF: ABNORMAL /HPF
BILIRUB UR QL STRIP: NEGATIVE
COLOR UR AUTO: ABNORMAL
GLUCOSE UR STRIP-MCNC: NEGATIVE MG/DL
HGB UR QL STRIP: NEGATIVE
KETONES UR STRIP-MCNC: NEGATIVE MG/DL
LEUKOCYTE ESTERASE UR QL STRIP: NEGATIVE
NITRATE UR QL: NEGATIVE
PH UR STRIP: 7.5 PH (ref 5–7)
RBC #/AREA URNS AUTO: ABNORMAL /HPF
SOURCE: ABNORMAL
SP GR UR STRIP: 1.01 (ref 1–1.03)
UROBILINOGEN UR STRIP-MCNC: NORMAL MG/DL (ref 0–2)
WBC #/AREA URNS AUTO: ABNORMAL /HPF

## 2021-06-07 PROCEDURE — 99204 OFFICE O/P NEW MOD 45 MIN: CPT | Performed by: UROLOGY

## 2021-06-07 PROCEDURE — 81001 URINALYSIS AUTO W/SCOPE: CPT | Performed by: UROLOGY

## 2021-06-07 RX ORDER — NARATRIPTAN 2.5 MG/1
2.5 TABLET ORAL
COMMUNITY

## 2021-06-07 RX ORDER — HYDROXYZINE HYDROCHLORIDE 25 MG/1
25 TABLET, FILM COATED ORAL AT BEDTIME
Qty: 30 TABLET | Refills: 11 | Status: SHIPPED | OUTPATIENT
Start: 2021-06-07 | End: 2021-10-04

## 2021-06-07 ASSESSMENT — PAIN SCALES - GENERAL: PAINLEVEL: MILD PAIN (2)

## 2021-06-07 NOTE — NURSING NOTE
Nia Schneider's goals for this visit include:   Chief Complaint   Patient presents with     New Patient     bladder pain       She requests these members of her care team be copied on today's visit information: yes, PCP    PCP: Radha Olmstead    Referring Provider:  No referring provider defined for this encounter.    Pulse 60   Resp 18   SpO2 99%     Do you need any medication refills at today's visit? None    post void residual: 0mL    Tanna Dwyer RN, BSN

## 2021-06-07 NOTE — PROGRESS NOTES
HPI:  Nia Schneider is a 46 year old female being seen for urinary urgency and frequency, as well as some bladder pain.  This started in 2017 and started rather suddenly.  She felt like she had a bladder infection and it got worse.  So went to the ER and was told that she had IC.  She did get 3 days of cipro which did not help.  She has changed her diet and limits her coffee and wine intake.  She also tried some other medication for overactive bladder but did not tolerate the side effects.  She has also try Amitriptyline but it was very sedating and she was not able to tolerate it.  [place] is very painful.  She also tried PT but did not find it help.  She uses vaginal valium sometimes at night and that has been what helps her the most.  But she only takes it at night.  She did not want to have a cystoscope while in [place] because she didn't feel comfortable.  When she goes on vacation and eats something she shouldn't it makes it worse and so do acidic foods.  She thinks she did have a DNA sequencing test and was treated based on it but it did not help.  She has tried prelief but that did not help.  She has not tried Elmiron.    She had an ablation in 2015 because she had her period every 21 days for 8 days and the periods were a little heavy but, and somewhat painful      Exam:  Pulse 60   Resp 18   SpO2 99%   GENERAL: Healthy, alert and no distress  EYES: Eyes grossly normal to inspection.  No discharge or erythema, or obvious scleral/conjunctival abnormalities.  RESP: No audible wheeze, cough, or visible cyanosis.  No visible retractions or increased work of breathing.    SKIN: Visible skin clear. No significant rash, abnormal pigmentation or lesions.  NEURO: Cranial nerves grossly intact.  Mentation and speech appropriate for age.  PSYCH: Mentation appears normal, affect normal/bright, judgement and insight intact, normal speech and appearance well-groomed.    Review of Labs:  The following labs were  reviewed by me and discussed with the patient:  UA today is normal with pH slightly elevated at 7.5  Urine micro with a few bacteria and many amorphous crystals.    Assessment & Plan   47 y/o female with urinary urgency and frequency in the setting of overactive bladder and bladder pain and no urinary tract infections.  She has exhausted conservative measures with PT, anticholinergics, amitriptyline, and even antibiotics without relief.  She tries to manage her pain by monitoring her dietary intake but at any given time she has continued pain, and has regular flare ups.  We discussed further w/u with cystoscopy and possible IC cocktail.  We also discussed hydroxyzine, acid reducers, allergy medication, and neurontin.  She would like to start with cystoscopy.  -hydroxyzine 25 mg nightly  -schedule cystoscopy and reassess      Yanna Page MD  [facility]      ==========================      Additional Coding Information:  Time spent:  52 minutes spent on the date of the encounter doing chart review, history and exam, documentation and further activities per the note

## 2021-08-31 ENCOUNTER — HOSPITAL ENCOUNTER (OUTPATIENT)
Dept: MAMMOGRAPHY | Facility: CLINIC | Age: 46
Discharge: HOME | End: 2021-08-31
Attending: FAMILY MEDICINE | Admitting: FAMILY MEDICINE
Payer: COMMERCIAL

## 2021-08-31 DIAGNOSIS — Z12.31 ENCOUNTER FOR SCREENING MAMMOGRAM FOR BREAST CANCER: ICD-10-CM

## 2021-08-31 PROCEDURE — 77067 SCR MAMMO BI INCL CAD: CPT

## 2021-09-26 ENCOUNTER — HEALTH MAINTENANCE LETTER (OUTPATIENT)
Age: 46
End: 2021-09-26

## 2021-10-04 ENCOUNTER — OFFICE VISIT (OUTPATIENT)
Dept: UROLOGY | Facility: CLINIC | Age: 46
End: 2021-10-04
Payer: COMMERCIAL

## 2021-10-04 DIAGNOSIS — R39.89 BLADDER PAIN: Primary | ICD-10-CM

## 2021-10-04 DIAGNOSIS — N30.20 CHRONIC CYSTITIS: ICD-10-CM

## 2021-10-04 DIAGNOSIS — R39.15 URINARY URGENCY: ICD-10-CM

## 2021-10-04 DIAGNOSIS — R35.0 FREQUENCY OF URINATION: ICD-10-CM

## 2021-10-04 LAB
ALBUMIN UR-MCNC: NEGATIVE MG/DL
APPEARANCE UR: CLEAR
BILIRUB UR QL STRIP: NEGATIVE
COLOR UR AUTO: YELLOW
GLUCOSE UR STRIP-MCNC: NEGATIVE MG/DL
HGB UR QL STRIP: NEGATIVE
KETONES UR STRIP-MCNC: NEGATIVE MG/DL
LEUKOCYTE ESTERASE UR QL STRIP: NEGATIVE
NITRATE UR QL: NEGATIVE
PH UR STRIP: 7.5 [PH] (ref 5–7)
SKIP: ABNORMAL
SP GR UR STRIP: 1.02 (ref 1–1.03)
UROBILINOGEN UR STRIP-MCNC: NORMAL MG/DL

## 2021-10-04 PROCEDURE — 81003 URINALYSIS AUTO W/O SCOPE: CPT | Performed by: UROLOGY

## 2021-10-04 PROCEDURE — 52204 CYSTOSCOPY W/BIOPSY(S): CPT | Performed by: UROLOGY

## 2021-10-04 PROCEDURE — 88305 TISSUE EXAM BY PATHOLOGIST: CPT | Performed by: PATHOLOGY

## 2021-10-04 RX ORDER — CIPROFLOXACIN 500 MG/1
500 TABLET, FILM COATED ORAL ONCE
Status: COMPLETED | OUTPATIENT
Start: 2021-10-04 | End: 2021-10-04

## 2021-10-04 RX ORDER — OXYBUTYNIN CHLORIDE 5 MG/1
5 TABLET, EXTENDED RELEASE ORAL DAILY
Qty: 30 TABLET | Refills: 11 | Status: SHIPPED | OUTPATIENT
Start: 2021-10-04 | End: 2022-11-29

## 2021-10-04 RX ORDER — NITROFURANTOIN MACROCRYSTALS 50 MG/1
50 CAPSULE ORAL AT BEDTIME
Qty: 30 CAPSULE | Refills: 11 | Status: SHIPPED | OUTPATIENT
Start: 2021-10-04 | End: 2022-11-29

## 2021-10-04 RX ADMIN — CIPROFLOXACIN 500 MG: 500 TABLET, FILM COATED ORAL at 10:09

## 2021-10-04 NOTE — PROGRESS NOTES
Reason for Visit:  Cystoscopy    Clinical Data: Ms. Nia Schneider is a 46 year old female with a hx of urinary urgency and frequency in the setting of overactive bladder and bladder pain with no utis.  She was started on hydroxyzine 25 mg nightly and she presents for cystoscopy to evaluate for any underlying cause for symptoms.  She reports that she had to stop it because it cause restless leg syndrome.  She then stopped her Geodon and that helped a little as well but she continues to feel irritation in the bladder.    Cystoscopy procedure:  Pt. Was consented and placed in the lithotomy position.  She was cleaned and preparred in the usual fashion.  Lidocain gel was inserted into the urethra and given time to take effect.  A 16 fr flexible cystoscope was then inserted through the urethra and into the bladder.  The urethra was wnl.  The bladder was with 1+ trabeculation.  No tumors, diverticulae, or stones.  There was this area of erythema towards the right posterior wall towards the dome.  This was biopsied.  Also there was a little cystitis cystica at the bladder neck.  Bilateral u/o's were effluxing clear urine.  The cystoscope was then withdrawn.  The pt. Tolerated the procedure well.    A/P:  46 year old female with urinary urgency and frequency as well as overactive bladder with a small punctate erythematous lesion in the bladder as described above and some cystitis cystica.  The lesion was biopsied.  Will let patient know what this shows.  We also discussed starting an anticholinergic for her symptoms and starting low dose antibiotics for the cystitis.  -oxybutynin 5 mg daily ER  -nitrofurantoin 50 mg  Nightly.  -f/u in 3 months    Thank you for allowing me to participate in the care of  Ms. Nia Schneider and I will keep you updated on her progress.    Yanna Page MD

## 2021-10-04 NOTE — NURSING NOTE
Nia Schneider's goals for this visit include:   Chief Complaint   Patient presents with     Cystoscopy     bladder pain       She requests these members of her care team be copied on today's visit information:     PCP: Radha Olmstead    Referring Provider:  No referring provider defined for this encounter.    There were no vitals taken for this visit.    Do you need any medication refills at today's visit?     Sophia Wick LPN on 10/4/2021 at 9:37 AM

## 2021-10-04 NOTE — PATIENT INSTRUCTIONS
"After Your Cystoscopy    What happens after the exam?    You may go back to your normal diet and activity as you feel ready, unless your doctor tells you not to.    For the next two days, you may notice:    Some blood in your urine  Some burning when you urinate (use the toilet)  An urge to urinate more often  Bladder spasms    These are normal after the procedure and should go away after a day or two.  To relieve these problems drink 6 to 8 large glasses of water each day (includes drinks at meals) as this will help clear the urine.  Take warm baths to relieve pain and bladder spasms.  Do not add anything to the bath water.  You may also take Tylenol (acetaminophen) for pain if needed.    When should I call my doctor?    A fever over 100F (38C) for more than a day. (Before you call the doctor, check your temperature under your tongue)  Chills  Failure to urinate: No urine comes out when you try to use the toilet. (Try soaking in a bathtub full of warm water. If still no urine, call your doctor)  A lot of blood in the urine, or blood clots larger than a nickel  Pain in the back or belly area (abdomen)  Pain or spasms that are not relieved by warm tub baths and pain medicine  Severe pain, burning or other problems while passing urine  Pain that gets worse after two days            AFTER YOUR CYSTOSCOPY        You have just completed a cystoscopy, or \"cysto\", which allowed your physician to learn more about your bladder (or to remove a stent placed after surgery). We suggest that you continue to avoid caffeine, fruit juice, and alcohol for the next 24 hours, however, you are encouraged to return to your normal activities.         A few things that are considered normal after your cystoscopy:     * Small amount of bleeding (or spotting) that clears within the next 24 hours     * Slight burning sensation with urination     * Sensation to of needing to avoid more frequently     * The feeling of \"air\" in your urine     * " Mild discomfort that is relieved with Tylenol        Please contact our office promptly if you:     * Develop a fever above 101 degrees     * Are unable to urinate     * Develop bright red blood that does not stop     * Severe pain or swelling         Please contact our office with any concerns or questions @[email or web address].    -oxybutynin 5 mg daily ER  -nitrofurantoin 50 mg  nightly.

## 2021-10-08 LAB
PATH REPORT.COMMENTS IMP SPEC: NORMAL
PATH REPORT.COMMENTS IMP SPEC: NORMAL
PATH REPORT.FINAL DX SPEC: NORMAL
PATH REPORT.GROSS SPEC: NORMAL
PATH REPORT.MICROSCOPIC SPEC OTHER STN: NORMAL
PHOTO IMAGE: NORMAL

## 2021-10-28 DIAGNOSIS — R35.0 FREQUENCY OF URINATION: ICD-10-CM

## 2021-10-28 DIAGNOSIS — R39.15 URINARY URGENCY: ICD-10-CM

## 2021-11-01 ENCOUNTER — TRANSFERRED RECORDS (OUTPATIENT)
Dept: HEALTH INFORMATION MANAGEMENT | Facility: CLINIC | Age: 46
End: 2021-11-01
Payer: COMMERCIAL

## 2021-11-01 RX ORDER — OXYBUTYNIN CHLORIDE 5 MG/1
TABLET, EXTENDED RELEASE ORAL
Qty: 30 TABLET | Refills: 11 | OUTPATIENT
Start: 2021-11-01

## 2021-12-01 ENCOUNTER — TRANSFERRED RECORDS (OUTPATIENT)
Dept: HEALTH INFORMATION MANAGEMENT | Facility: CLINIC | Age: 46
End: 2021-12-01
Payer: COMMERCIAL

## 2022-01-19 ENCOUNTER — TRANSFERRED RECORDS (OUTPATIENT)
Dept: HEALTH INFORMATION MANAGEMENT | Facility: CLINIC | Age: 47
End: 2022-01-19
Payer: COMMERCIAL

## 2022-06-06 ENCOUNTER — TRANSFERRED RECORDS (OUTPATIENT)
Dept: HEALTH INFORMATION MANAGEMENT | Facility: CLINIC | Age: 47
End: 2022-06-06

## 2022-07-02 ENCOUNTER — HEALTH MAINTENANCE LETTER (OUTPATIENT)
Age: 47
End: 2022-07-02

## 2022-09-23 ENCOUNTER — HOSPITAL ENCOUNTER (EMERGENCY)
Facility: HOSPITAL | Age: 47
Discharge: HOME | End: 2022-09-23
Payer: COMMERCIAL

## 2022-09-23 VITALS
HEART RATE: 68 BPM | TEMPERATURE: 98.2 F | RESPIRATION RATE: 18 BRPM | DIASTOLIC BLOOD PRESSURE: 93 MMHG | SYSTOLIC BLOOD PRESSURE: 142 MMHG | OXYGEN SATURATION: 100 % | BODY MASS INDEX: 18.44 KG/M2 | WEIGHT: 116 LBS

## 2022-09-23 NOTE — ED TRIAGE NOTES
Triage Assessment     Row Name 09/23/22 0924       Triage Assessment (Adult)    Airway WDL WDL

## 2022-09-23 NOTE — ED TRIAGE NOTES
-Patient arrives from home with complaints of right sided facial tingling. Patient reports that right sided tingling developed the morning of 9/22. Reports that sensation feels like cold with slight decrease in sensation. Patient without vision changes. Facial expression symmetrical. Patient alert.       Triage Assessment     Row Name 09/23/22 0924       Triage Assessment (Adult)    Airway WDL WDL

## 2022-09-23 NOTE — ED PROVIDER NOTES
Expected call from the urgency room.  47-year-old female with unilateral facial numbness for 24 hours.  Spares forehead.     William Sommer MD  09/23/22 0952

## 2022-11-04 ENCOUNTER — ANCILLARY PROCEDURE (OUTPATIENT)
Dept: MAMMOGRAPHY | Facility: CLINIC | Age: 47
End: 2022-11-04
Attending: FAMILY MEDICINE
Payer: COMMERCIAL

## 2022-11-04 DIAGNOSIS — Z12.31 VISIT FOR SCREENING MAMMOGRAM: ICD-10-CM

## 2022-11-04 PROCEDURE — 77067 SCR MAMMO BI INCL CAD: CPT | Mod: TC | Performed by: RADIOLOGY

## 2022-11-04 PROCEDURE — 77063 BREAST TOMOSYNTHESIS BI: CPT | Mod: TC | Performed by: RADIOLOGY

## 2022-11-22 ASSESSMENT — ENCOUNTER SYMPTOMS
DIZZINESS: 0
CHILLS: 0
HEMATURIA: 0
SHORTNESS OF BREATH: 0
EYE PAIN: 0
NAUSEA: 0
COUGH: 0
MYALGIAS: 0
NERVOUS/ANXIOUS: 0
JOINT SWELLING: 0
HEADACHES: 0
PARESTHESIAS: 0
HEMATOCHEZIA: 0
ABDOMINAL PAIN: 0
PALPITATIONS: 0
WEAKNESS: 0
CONSTIPATION: 0
ARTHRALGIAS: 0
FEVER: 0
DYSURIA: 0
HEARTBURN: 0
BREAST MASS: 0
SORE THROAT: 0
DIARRHEA: 0
FREQUENCY: 0

## 2022-11-29 ENCOUNTER — OFFICE VISIT (OUTPATIENT)
Dept: FAMILY MEDICINE | Facility: CLINIC | Age: 47
End: 2022-11-29
Payer: COMMERCIAL

## 2022-11-29 VITALS
TEMPERATURE: 97.6 F | HEART RATE: 62 BPM | SYSTOLIC BLOOD PRESSURE: 108 MMHG | HEIGHT: 66 IN | DIASTOLIC BLOOD PRESSURE: 72 MMHG | WEIGHT: 120.6 LBS | BODY MASS INDEX: 19.38 KG/M2 | OXYGEN SATURATION: 99 %

## 2022-11-29 DIAGNOSIS — R39.15 URINARY URGENCY: ICD-10-CM

## 2022-11-29 DIAGNOSIS — Z00.00 ROUTINE GENERAL MEDICAL EXAMINATION AT A HEALTH CARE FACILITY: Primary | ICD-10-CM

## 2022-11-29 DIAGNOSIS — Z12.11 SCREEN FOR COLON CANCER: ICD-10-CM

## 2022-11-29 DIAGNOSIS — F31.81 BIPOLAR 2 DISORDER (H): ICD-10-CM

## 2022-11-29 DIAGNOSIS — R35.0 FREQUENCY OF URINATION: ICD-10-CM

## 2022-11-29 DIAGNOSIS — N30.10 INTERSTITIAL CYSTITIS: ICD-10-CM

## 2022-11-29 PROCEDURE — 99396 PREV VISIT EST AGE 40-64: CPT | Performed by: FAMILY MEDICINE

## 2022-11-29 RX ORDER — OXYBUTYNIN CHLORIDE 5 MG/1
5 TABLET, EXTENDED RELEASE ORAL DAILY
Qty: 90 TABLET | Refills: 3 | Status: SHIPPED | OUTPATIENT
Start: 2022-11-29 | End: 2023-11-30

## 2022-11-29 RX ORDER — DIAZEPAM 5 MG
TABLET ORAL
Qty: 30 TABLET | Refills: 0 | Status: SHIPPED | OUTPATIENT
Start: 2022-11-29 | End: 2023-11-30

## 2022-11-29 ASSESSMENT — ENCOUNTER SYMPTOMS
DIARRHEA: 0
ABDOMINAL PAIN: 0
CONSTIPATION: 0
HEMATOCHEZIA: 0
HEADACHES: 0
HEARTBURN: 0
ARTHRALGIAS: 0
FREQUENCY: 0
SHORTNESS OF BREATH: 0
COUGH: 0
WEAKNESS: 0
BREAST MASS: 0
SORE THROAT: 0
NAUSEA: 0
PALPITATIONS: 0
FEVER: 0
MYALGIAS: 0
PARESTHESIAS: 0
NERVOUS/ANXIOUS: 0
EYE PAIN: 0
DYSURIA: 0
DIZZINESS: 0
JOINT SWELLING: 0
CHILLS: 0
HEMATURIA: 0

## 2022-11-29 NOTE — PROGRESS NOTES
SUBJECTIVE:   CC: Heather is an 47 year old who presents for preventive health visit.     Patient has been advised of split billing requirements and indicates understanding: Yes  Healthy Habits:     Getting at least 3 servings of Calcium per day:  Yes    Bi-annual eye exam:  NO    Dental care twice a year:  Yes    Sleep apnea or symptoms of sleep apnea:  None    Diet:  Other    Frequency of exercise:  6-7 days/week    Duration of exercise:  30-45 minutes    Taking medications regularly:  Yes    Medication side effects:  None    PHQ-2 Total Score: 0    Additional concerns today:  No      Initially was not taking the oxybutynin when it was first prescribed.  Felt pretty good at that time.  Did not tolerate the antibiotic that was started.    Had a flare with the weaning of her lexapro so started the oxybutynin and has been helpful.  Continues to use the Valium vaginally uses this sporadically when she has a flare.  Not often.    *  continues to follow with psych.  Has been weaning the lexapro    *  Sees Tiff for migraine management.  Uses Amerge as needed    Today's PHQ-2 Score:   PHQ-2 ( 1999 Pfizer) 11/22/2022   Q1: Little interest or pleasure in doing things 0   Q2: Feeling down, depressed or hopeless 0   PHQ-2 Score 0   PHQ-2 Total Score (12-17 Years)- Positive if 3 or more points; Administer PHQ-A if positive -   Q1: Little interest or pleasure in doing things Not at all   Q2: Feeling down, depressed or hopeless Not at all   PHQ-2 Score 0           Social History     Tobacco Use     Smoking status: Never     Smokeless tobacco: Never   Substance Use Topics     Alcohol use: No         Alcohol Use 11/22/2022   Prescreen: >3 drinks/day or >7 drinks/week? No       Reviewed orders with patient.  Reviewed health maintenance and updated orders accordingly - Yes      Breast Cancer Screening:    Breast CA Risk Assessment (FHS-7) 4/8/2021   Do you have a family history of breast, colon, or ovarian cancer? No / Unknown  "        Mammogram Screening: Recommended annual mammography  Pertinent mammograms are reviewed under the imaging tab.    History of abnormal Pap smear: NO - age 30-65 PAP every 5 years with negative HPV co-testing recommended  PAP / HPV Latest Ref Rng & Units 9/6/2018   PAP (Historical) - NIL   HPV16 NEG:Negative Negative   HPV18 NEG:Negative Negative   HRHPV NEG:Negative Negative     Reviewed and updated as needed this visit by clinical staff   Tobacco  Allergies  Meds              Reviewed and updated as needed this visit by Provider   Tobacco  Allergies  Meds             No past medical history on file.   Past Surgical History:   Procedure Laterality Date     SURGICAL HISTORY OF -  Right 2009    ganglion cyst       Review of Systems   Constitutional: Negative for chills and fever.   HENT: Negative for congestion, ear pain, hearing loss and sore throat.    Eyes: Negative for pain and visual disturbance.   Respiratory: Negative for cough and shortness of breath.    Cardiovascular: Negative for chest pain, palpitations and peripheral edema.   Gastrointestinal: Negative for abdominal pain, constipation, diarrhea, heartburn, hematochezia and nausea.   Breasts:  Negative for tenderness, breast mass and discharge.   Genitourinary: Negative for dysuria, frequency, genital sores, hematuria, pelvic pain, urgency, vaginal bleeding and vaginal discharge.   Musculoskeletal: Negative for arthralgias, joint swelling and myalgias.   Skin: Negative for rash.   Neurological: Negative for dizziness, weakness, headaches and paresthesias.   Psychiatric/Behavioral: Negative for mood changes. The patient is not nervous/anxious.           OBJECTIVE:   /72   Pulse 62   Temp 97.6  F (36.4  C) (Tympanic)   Ht 1.67 m (5' 5.75\")   Wt 54.7 kg (120 lb 9.6 oz)   SpO2 99%   BMI 19.61 kg/m    Physical Exam  GENERAL: healthy, alert and no distress  EYES: Eyes grossly normal to inspection, PERRL and conjunctivae and sclerae " normal  NECK: no adenopathy, no asymmetry, masses, or scars and thyroid normal to palpation  RESP: lungs clear to auscultation - no rales, rhonchi or wheezes  BREAST: declined by pt  CV: regular rate and rhythm, normal S1 S2, no S3 or S4, no murmur, click or rub, no peripheral edema and peripheral pulses strong  ABDOMEN: soft, nontender, no hepatosplenomegaly, no masses and bowel sounds normal  MS: no gross musculoskeletal defects noted, no edema  NEURO: Normal strength and tone, mentation intact and speech normal  PSYCH: mentation appears normal, affect normal/bright    Diagnostic Test Results:  Labs reviewed in Epic    ASSESSMENT/PLAN:       ICD-10-CM    1. Routine general medical examination at a health care facility  Z00.00       2. Interstitial cystitis  N30.10 diazepam (VALIUM) 5 MG tablet      3. Urinary urgency  R39.15 oxybutynin ER (DITROPAN XL) 5 MG 24 hr tablet      4. Frequency of urination  R35.0 oxybutynin ER (DITROPAN XL) 5 MG 24 hr tablet      5. Bipolar 2 disorder (H)  F31.81       6. Screen for colon cancer  Z12.11 COLOGUARD([employer])        Interstitial cystitis:  meds refilled, stable    Bipolar:  Follows with psych, stable.        Patient has been advised of split billing requirements and indicates understanding: Yes      COUNSELING:  Reviewed preventive health counseling, as reflected in patient instructions        She reports that she has never smoked. She has never used smokeless tobacco.          Radha Olmstead DO  [facility]

## 2022-11-29 NOTE — NURSING NOTE
"Initial /72   Pulse 62   Temp 97.6  F (36.4  C) (Tympanic)   Ht 1.67 m (5' 5.75\")   Wt 54.7 kg (120 lb 9.6 oz)   SpO2 99%   BMI 19.61 kg/m   Estimated body mass index is 19.61 kg/m  as calculated from the following:    Height as of this encounter: 1.67 m (5' 5.75\").    Weight as of this encounter: 54.7 kg (120 lb 9.6 oz). .      "

## 2022-12-13 ENCOUNTER — TRANSFERRED RECORDS (OUTPATIENT)
Dept: HEALTH INFORMATION MANAGEMENT | Facility: CLINIC | Age: 47
End: 2022-12-13

## 2023-01-02 LAB — NONINV COLON CA DNA+OCC BLD SCRN STL QL: NEGATIVE

## 2023-01-27 ENCOUNTER — TRANSFERRED RECORDS (OUTPATIENT)
Dept: HEALTH INFORMATION MANAGEMENT | Facility: CLINIC | Age: 48
End: 2023-01-27
Payer: COMMERCIAL

## 2023-04-23 ENCOUNTER — HEALTH MAINTENANCE LETTER (OUTPATIENT)
Age: 48
End: 2023-04-23

## 2023-08-07 ENCOUNTER — TRANSFERRED RECORDS (OUTPATIENT)
Dept: HEALTH INFORMATION MANAGEMENT | Facility: CLINIC | Age: 48
End: 2023-08-07
Payer: COMMERCIAL

## 2023-08-21 ENCOUNTER — TRANSFERRED RECORDS (OUTPATIENT)
Dept: HEALTH INFORMATION MANAGEMENT | Facility: CLINIC | Age: 48
End: 2023-08-21

## 2023-09-13 DIAGNOSIS — R35.0 FREQUENCY OF URINATION: ICD-10-CM

## 2023-09-13 DIAGNOSIS — R39.15 URINARY URGENCY: ICD-10-CM

## 2023-09-13 RX ORDER — OXYBUTYNIN CHLORIDE 5 MG/1
5 TABLET, EXTENDED RELEASE ORAL DAILY
Qty: 90 TABLET | Refills: 3 | OUTPATIENT
Start: 2023-09-13

## 2023-10-05 ENCOUNTER — PATIENT OUTREACH (OUTPATIENT)
Dept: CARE COORDINATION | Facility: CLINIC | Age: 48
End: 2023-10-05
Payer: COMMERCIAL

## 2023-11-02 ENCOUNTER — PATIENT OUTREACH (OUTPATIENT)
Dept: CARE COORDINATION | Facility: CLINIC | Age: 48
End: 2023-11-02
Payer: COMMERCIAL

## 2023-11-08 ENCOUNTER — ANCILLARY PROCEDURE (OUTPATIENT)
Dept: MAMMOGRAPHY | Facility: CLINIC | Age: 48
End: 2023-11-08
Attending: FAMILY MEDICINE
Payer: COMMERCIAL

## 2023-11-08 DIAGNOSIS — Z12.31 VISIT FOR SCREENING MAMMOGRAM: ICD-10-CM

## 2023-11-08 PROCEDURE — 77063 BREAST TOMOSYNTHESIS BI: CPT | Mod: TC | Performed by: RADIOLOGY

## 2023-11-08 PROCEDURE — 77067 SCR MAMMO BI INCL CAD: CPT | Mod: TC | Performed by: RADIOLOGY

## 2023-11-23 ASSESSMENT — ENCOUNTER SYMPTOMS
FREQUENCY: 0
SORE THROAT: 0
COUGH: 0
PALPITATIONS: 0
MYALGIAS: 0
PARESTHESIAS: 0
ARTHRALGIAS: 0
HEMATURIA: 0
SHORTNESS OF BREATH: 0
DIZZINESS: 0
WEAKNESS: 0
NERVOUS/ANXIOUS: 0
CONSTIPATION: 0
DIARRHEA: 0
EYE PAIN: 0
BREAST MASS: 0
HEMATOCHEZIA: 0
DYSURIA: 0
FEVER: 0
ABDOMINAL PAIN: 0
JOINT SWELLING: 0
HEADACHES: 0
HEARTBURN: 0
NAUSEA: 0
CHILLS: 0

## 2023-11-30 ENCOUNTER — OFFICE VISIT (OUTPATIENT)
Dept: FAMILY MEDICINE | Facility: CLINIC | Age: 48
End: 2023-11-30
Payer: COMMERCIAL

## 2023-11-30 VITALS
WEIGHT: 120 LBS | DIASTOLIC BLOOD PRESSURE: 82 MMHG | BODY MASS INDEX: 19.29 KG/M2 | HEART RATE: 66 BPM | TEMPERATURE: 97.6 F | OXYGEN SATURATION: 98 % | HEIGHT: 66 IN | RESPIRATION RATE: 12 BRPM | SYSTOLIC BLOOD PRESSURE: 120 MMHG

## 2023-11-30 DIAGNOSIS — Z12.4 CERVICAL CANCER SCREENING: ICD-10-CM

## 2023-11-30 DIAGNOSIS — Z00.00 ROUTINE GENERAL MEDICAL EXAMINATION AT A HEALTH CARE FACILITY: Primary | ICD-10-CM

## 2023-11-30 DIAGNOSIS — F31.81 BIPOLAR 2 DISORDER (H): ICD-10-CM

## 2023-11-30 DIAGNOSIS — N30.10 INTERSTITIAL CYSTITIS: ICD-10-CM

## 2023-11-30 DIAGNOSIS — R39.15 URINARY URGENCY: ICD-10-CM

## 2023-11-30 DIAGNOSIS — R35.0 FREQUENCY OF URINATION: ICD-10-CM

## 2023-11-30 PROCEDURE — G0145 SCR C/V CYTO,THINLAYER,RESCR: HCPCS | Performed by: FAMILY MEDICINE

## 2023-11-30 PROCEDURE — 99396 PREV VISIT EST AGE 40-64: CPT | Performed by: FAMILY MEDICINE

## 2023-11-30 PROCEDURE — 87624 HPV HI-RISK TYP POOLED RSLT: CPT | Performed by: FAMILY MEDICINE

## 2023-11-30 RX ORDER — ESCITALOPRAM OXALATE 10 MG/1
10 TABLET ORAL DAILY
COMMUNITY
Start: 2023-11-30

## 2023-11-30 RX ORDER — DIAZEPAM 5 MG
TABLET ORAL
Qty: 30 TABLET | Refills: 0 | Status: SHIPPED | OUTPATIENT
Start: 2023-11-30

## 2023-11-30 RX ORDER — OXYBUTYNIN CHLORIDE 5 MG/1
5 TABLET, EXTENDED RELEASE ORAL DAILY
Qty: 90 TABLET | Refills: 3 | Status: SHIPPED | OUTPATIENT
Start: 2023-11-30

## 2023-11-30 NOTE — PROGRESS NOTES
SUBJECTIVE:   Heather is a 48 year old, presenting for the following:  Physical        Healthy Habits:     Getting at least 3 servings of Calcium per day:  Yes    Bi-annual eye exam:  Yes    Dental care twice a year:  Yes    Sleep apnea or symptoms of sleep apnea:  None    Diet:  Other    Frequency of exercise:  6-7 days/week    Duration of exercise:  30-45 minutes    Taking medications regularly:  Yes    Medication side effects:  None    Additional concerns today:  No      Feels bladder symptoms are well controlled.  Uses the vaginal valium about 2 times per month.      Today's PHQ-2 Score:       2023     9:51 AM   PHQ-2 (  [employer])   Q1: Little interest or pleasure in doing things 0   Q2: Feeling down, depressed or hopeless 0   PHQ-2 Score 0   Q1: Little interest or pleasure in doing things Not at all   Q2: Feeling down, depressed or hopeless Not at all   PHQ-2 Score 0     Things are going well with mental health.  Sees psychiatry regularly for management        Social History     Tobacco Use    Smoking status: Never    Smokeless tobacco: Never   Substance Use Topics    Alcohol use: No             2023     9:46 AM   Alcohol Use   Prescreen: >3 drinks/day or >7 drinks/week? No     Reviewed orders with patient.  Reviewed health maintenance and updated orders accordingly - Yes      Breast Cancer Screenin/8/2021    11:33 AM   Breast CA Risk Assessment (FHS-7)   Do you have a family history of breast, colon, or ovarian cancer? No / Unknown         Mammogram Screening: Recommended annual mammography  Pertinent mammograms are reviewed under the imaging tab.    History of abnormal Pap smear: NO - age 30-65 PAP every 5 years with negative HPV co-testing recommended      Latest Ref Rng & Units 2018    12:26 PM 2018    12:16 PM   PAP / HPV   PAP (Historical)   NIL    HPV 16 DNA NEG^Negative Negative     HPV 18 DNA NEG^Negative Negative     Other HR HPV NEG^Negative Negative       Reviewed  "and updated as needed this visit by clinical staff   Tobacco  Allergies  Meds              Reviewed and updated as needed this visit by Provider    Allergies  Meds             Past Medical History:   Diagnosis Date    Depressive disorder 2014    treated, stable now      Past Surgical History:   Procedure Laterality Date    BIOPSY  1/2009    ganglion cyst in foot, benign    GYN SURGERY  2014    endometrial ablation    SURGICAL HISTORY OF -  Right 2009    ganglion cyst       Review of Systems  ROS: Remainder of Constitutional, CV, Respiratory, GI,  negative with exception of that mentioned above       OBJECTIVE:   /82   Pulse 66   Temp 97.6  F (36.4  C) (Tympanic)   Resp 12   Ht 1.681 m (5' 6.2\")   Wt 54.4 kg (120 lb)   SpO2 98%   BMI 19.25 kg/m    Physical Exam  GENERAL: healthy, alert and no distress  EYES: Eyes grossly normal to inspection, PERRL and conjunctivae and sclerae normal  NECK: no adenopathy, no asymmetry, masses, or scars and thyroid normal to palpation  RESP: lungs clear to auscultation - no rales, rhonchi or wheezes  BREAST: declined by pt  CV: regular rate and rhythm, normal S1 S2, no S3 or S4, no murmur, click or rub, no peripheral edema and peripheral pulses strong  ABDOMEN: soft, nontender, no hepatosplenomegaly, no masses and bowel sounds normal   (female): normal female external genitalia, normal urethral meatus, vaginal mucosa pink, moist, well rugated, and normal cervix/adnexa/uterus without masses or discharge  MS: no gross musculoskeletal defects noted, no edema  NEURO: Normal strength and tone, mentation intact and speech normal  PSYCH: mentation appears normal, affect normal/bright    Diagnostic Test Results:  Labs reviewed in Epic    ASSESSMENT/PLAN:       ICD-10-CM    1. Routine general medical examination at a health care facility  Z00.00       2. Bipolar 2 disorder (H)  F31.81 escitalopram (LEXAPRO) 10 MG tablet      3. Urinary urgency  R39.15 oxyBUTYnin ER " (DITROPAN XL) 5 MG 24 hr tablet      4. Frequency of urination  R35.0 oxyBUTYnin ER (DITROPAN XL) 5 MG 24 hr tablet      5. Interstitial cystitis  N30.10 diazepam (VALIUM) 5 MG tablet      6. Cervical cancer screening  Z12.4 Pap Screen with HPV - recommended age 30 - 65 years        Bipolar:  managed by psych, controlled    Interstitial cystitis:  controlled on above regimen    Declines, hep B,  flu and covid vaccine.  Will consider shingles    Patient has been advised of split billing requirements and indicates understanding: Yes      COUNSELING:  Reviewed preventive health counseling, as reflected in patient instructions        She reports that she has never smoked. She has never used smokeless tobacco.          Radha Olmstead [facility]

## 2023-12-04 LAB
BKR LAB AP GYN ADEQUACY: NORMAL
BKR LAB AP GYN INTERPRETATION: NORMAL
BKR LAB AP HPV REFLEX: NORMAL
BKR LAB AP PREVIOUS ABNORMAL: NORMAL
PATH REPORT.COMMENTS IMP SPEC: NORMAL
PATH REPORT.COMMENTS IMP SPEC: NORMAL
PATH REPORT.RELEVANT HX SPEC: NORMAL

## 2023-12-06 LAB
HUMAN PAPILLOMA VIRUS 16 DNA: NEGATIVE
HUMAN PAPILLOMA VIRUS 18 DNA: NEGATIVE
HUMAN PAPILLOMA VIRUS FINAL DIAGNOSIS: NORMAL
HUMAN PAPILLOMA VIRUS OTHER HR: NEGATIVE

## 2024-08-01 ENCOUNTER — TRANSFERRED RECORDS (OUTPATIENT)
Dept: HEALTH INFORMATION MANAGEMENT | Facility: CLINIC | Age: 49
End: 2024-08-01
Payer: COMMERCIAL

## 2024-10-31 ENCOUNTER — PATIENT OUTREACH (OUTPATIENT)
Dept: CARE COORDINATION | Facility: CLINIC | Age: 49
End: 2024-10-31
Payer: COMMERCIAL

## 2024-11-14 ENCOUNTER — PATIENT OUTREACH (OUTPATIENT)
Dept: CARE COORDINATION | Facility: CLINIC | Age: 49
End: 2024-11-14
Payer: COMMERCIAL

## 2024-12-08 SDOH — HEALTH STABILITY: PHYSICAL HEALTH: ON AVERAGE, HOW MANY MINUTES DO YOU ENGAGE IN EXERCISE AT THIS LEVEL?: 40 MIN

## 2024-12-08 SDOH — HEALTH STABILITY: PHYSICAL HEALTH: ON AVERAGE, HOW MANY DAYS PER WEEK DO YOU ENGAGE IN MODERATE TO STRENUOUS EXERCISE (LIKE A BRISK WALK)?: 6 DAYS

## 2024-12-08 ASSESSMENT — SOCIAL DETERMINANTS OF HEALTH (SDOH): HOW OFTEN DO YOU GET TOGETHER WITH FRIENDS OR RELATIVES?: ONCE A WEEK

## 2024-12-10 ENCOUNTER — OFFICE VISIT (OUTPATIENT)
Dept: FAMILY MEDICINE | Facility: CLINIC | Age: 49
End: 2024-12-10
Payer: COMMERCIAL

## 2024-12-10 VITALS
TEMPERATURE: 97.3 F | RESPIRATION RATE: 12 BRPM | HEART RATE: 55 BPM | OXYGEN SATURATION: 99 % | SYSTOLIC BLOOD PRESSURE: 124 MMHG | BODY MASS INDEX: 19.61 KG/M2 | HEIGHT: 66 IN | DIASTOLIC BLOOD PRESSURE: 86 MMHG | WEIGHT: 122 LBS

## 2024-12-10 DIAGNOSIS — G25.81 RESTLESS LEGS SYNDROME (RLS): ICD-10-CM

## 2024-12-10 DIAGNOSIS — F31.81 BIPOLAR 2 DISORDER (H): ICD-10-CM

## 2024-12-10 DIAGNOSIS — Z13.1 SCREENING FOR DIABETES MELLITUS: ICD-10-CM

## 2024-12-10 DIAGNOSIS — R39.15 URINARY URGENCY: ICD-10-CM

## 2024-12-10 DIAGNOSIS — Z13.6 CARDIOVASCULAR SCREENING; LDL GOAL LESS THAN 100: ICD-10-CM

## 2024-12-10 DIAGNOSIS — Z00.00 ROUTINE GENERAL MEDICAL EXAMINATION AT A HEALTH CARE FACILITY: Primary | ICD-10-CM

## 2024-12-10 DIAGNOSIS — R35.0 FREQUENCY OF URINATION: ICD-10-CM

## 2024-12-10 LAB
CHOLEST SERPL-MCNC: 225 MG/DL
FASTING STATUS PATIENT QL REPORTED: YES
FASTING STATUS PATIENT QL REPORTED: YES
FERRITIN SERPL-MCNC: 59 NG/ML (ref 11–328)
GLUCOSE SERPL-MCNC: 93 MG/DL (ref 70–99)
HDLC SERPL-MCNC: 97 MG/DL
LDLC SERPL CALC-MCNC: 114 MG/DL
NONHDLC SERPL-MCNC: 128 MG/DL
TRIGL SERPL-MCNC: 68 MG/DL

## 2024-12-10 PROCEDURE — 80061 LIPID PANEL: CPT | Performed by: FAMILY MEDICINE

## 2024-12-10 PROCEDURE — 82728 ASSAY OF FERRITIN: CPT | Performed by: FAMILY MEDICINE

## 2024-12-10 PROCEDURE — 36415 COLL VENOUS BLD VENIPUNCTURE: CPT | Performed by: FAMILY MEDICINE

## 2024-12-10 PROCEDURE — 99396 PREV VISIT EST AGE 40-64: CPT | Performed by: FAMILY MEDICINE

## 2024-12-10 PROCEDURE — 82947 ASSAY GLUCOSE BLOOD QUANT: CPT | Performed by: FAMILY MEDICINE

## 2024-12-10 RX ORDER — OXYBUTYNIN CHLORIDE 5 MG/1
5 TABLET, EXTENDED RELEASE ORAL DAILY
Qty: 90 TABLET | Refills: 2 | Status: SHIPPED | OUTPATIENT
Start: 2024-12-10

## 2024-12-10 RX ORDER — RIZATRIPTAN BENZOATE 5 MG/1
5 TABLET, ORALLY DISINTEGRATING ORAL
COMMUNITY
Start: 2024-08-01

## 2024-12-10 NOTE — PATIENT INSTRUCTIONS
Patient Education   Preventive Care Advice   This is general advice given by our system to help you stay healthy. However, your care team may have specific advice just for you. Please talk to your care team about your preventive care needs.  Nutrition  Eat 5 or more servings of fruits and vegetables each day.  Try wheat bread, brown rice and whole grain pasta (instead of white bread, rice, and pasta).  Get enough calcium and vitamin D. Check the label on foods and aim for 100% of the RDA (recommended daily allowance).  Lifestyle  Exercise at least 150 minutes each week  (30 minutes a day, 5 days a week).  Do muscle strengthening activities 2 days a week. These help control your weight and prevent disease.  No smoking.  Wear sunscreen to prevent skin cancer.  Have a dental exam and cleaning every 6 months.  Yearly exams  See your health care team every year to talk about:  Any changes in your health.  Any medicines your care team has prescribed.  Preventive care, family planning, and ways to prevent chronic diseases.  Shots (vaccines)   HPV shots (up to age 26), if you've never had them before.  Hepatitis B shots (up to age 59), if you've never had them before.  COVID-19 shot: Get this shot when it's due.  Flu shot: Get a flu shot every year.  Tetanus shot: Get a tetanus shot every 10 years.  Pneumococcal, hepatitis A, and RSV shots: Ask your care team if you need these based on your risk.  Shingles shot (for age 50 and up)  General health tests  Diabetes screening:  Starting at age 35, Get screened for diabetes at least every 3 years.  If you are younger than age 35, ask your care team if you should be screened for diabetes.  Cholesterol test: At age 39, start having a cholesterol test every 5 years, or more often if advised.  Bone density scan (DEXA): At age 50, ask your care team if you should have this scan for osteoporosis (brittle bones).  Hepatitis C: Get tested at least once in your life.  STIs (sexually  transmitted infections)  Before age 24: Ask your care team if you should be screened for STIs.  After age 24: Get screened for STIs if you're at risk. You are at risk for STIs (including HIV) if:  You are sexually active with more than one person.  You don't use condoms every time.  You or a partner was diagnosed with a sexually transmitted infection.  If you are at risk for HIV, ask about PrEP medicine to prevent HIV.  Get tested for HIV at least once in your life, whether you are at risk for HIV or not.  Cancer screening tests  Cervical cancer screening: If you have a cervix, begin getting regular cervical cancer screening tests starting at age 21.  Breast cancer scan (mammogram): If you've ever had breasts, begin having regular mammograms starting at age 40. This is a scan to check for breast cancer.  Colon cancer screening: It is important to start screening for colon cancer at age 45.  Have a colonoscopy test every 10 years (or more often if you're at risk) Or, ask your provider about stool tests like a FIT test every year or Cologuard test every 3 years.  To learn more about your testing options, visit:   .  For help making a decision, visit:   https://bit.ly/py93016.  Prostate cancer screening test: If you have a prostate, ask your care team if a prostate cancer screening test (PSA) at age 55 is right for you.  Lung cancer screening: If you are a current or former smoker ages 50 to 80, ask your care team if ongoing lung cancer screenings are right for you.  For informational purposes only. Not to replace the advice of your health care provider. Copyright   2023 [employer]. All rights reserved. Clinically reviewed by the [facility] Transitions Program. "[employer] 810019 - REV 01/24.

## 2024-12-10 NOTE — PROGRESS NOTES
Preventive Care Visit  [facility], [facility]  Dec 10, 2024        ICD-10-CM    1. Routine general medical examination at a health care facility  Z00.00       2. Frequency of urination  R35.0 oxyBUTYnin ER (DITROPAN XL) 5 MG 24 hr tablet      3. Urinary urgency  R39.15 oxyBUTYnin ER (DITROPAN XL) 5 MG 24 hr tablet      4. Restless legs syndrome (RLS)  G25.81 Ferritin      5. Bipolar 2 disorder (H)  F31.81       6. CARDIOVASCULAR SCREENING; LDL GOAL LESS THAN 100  Z13.6 Lipid panel reflex to direct LDL Fasting      7. Screening for diabetes mellitus  Z13.1 Glucose        Urinary symptoms:  stable on ditropan daily and sparing prn vaginal diazepam.  Refills provided.    RLS:  improved now, check ferritin    Bipolar:  stable, some increased depression with worsened HA, will reach out to [facility] to establish with therapy, sees med management there.    Screening labs as ordered, reviewed diet and exercise, calcium and vitamin D.  Mammogram scheduled, other screenings up to date    Declines flu and covid today, considering Shingrix      Subjective   Heather is a 49 year old, presenting for the following:  Physical        12/10/2024     8:10 AM   Additional Questions   Roomed by Jacqueline CRUZ   Accompanied by Self           HPI  Concerns:  Neurologist recommended labs for iron   Was gong through an RLS phase and neurologist recommend iron stores.  Feels symptoms have improved now.      Seeing Tiff for her headache management.  They have been increased in frequency       Bladder symptoms have been okay.  Using the ditropan daily and rarealy using diazepam, maybe 6-7 times this year.  No refills needed.     Struggling a bit with mood, not feeling very motivated.  Seeing psych through [facility].  Not currently seeing therapy        Health Care Directive  Patient does not have a Health Care Directive: Discussed advance care planning with patient; information given to patient to review.       12/8/2024   General Health   How would you rate your overall physical health? Good   Feel stress (tense, anxious, or unable to sleep) Not at all            12/8/2024   Nutrition   Three or more servings of calcium each day? Yes   Diet: Regular (no restrictions)   How many servings of fruit and vegetables per day? (!) 2-3   How many sweetened beverages each day? 0-1            12/8/2024   Exercise   Days per week of moderate/strenous exercise 6 days   Average minutes spent exercising at this level 40 min            12/8/2024   Social Factors   Frequency of gathering with friends or relatives Once a week   Worry food won't last until get money to buy more No   Food not last or not have enough money for food? No   Do you have housing? (Housing is defined as stable permanent housing and does not include staying ouside in a car, in a tent, in an abandoned building, in an overnight shelter, or couch-surfing.) Yes   Are you worried about losing your housing? No   Lack of transportation? No   Unable to get utilities (heat,electricity)? No            12/8/2024   Fall Risk   Fallen 2 or more times in the past year? No    Trouble with walking or balance? No        Patient-reported          12/8/2024   Dental   Dentist two times every year? Yes            12/8/2024   TB Screening   Were you born outside of the [place]? No            Today's PHQ-2 Score:       12/10/2024     8:05 AM   PHQ-2 ( 1999 Pfizer)   Q1: Little interest or pleasure in doing things 1    Q2: Feeling down, depressed or hopeless 1    PHQ-2 Score 2    Q1: Little interest or pleasure in doing things Several days   Q2: Feeling down, depressed or hopeless Several days   PHQ-2 Score 2       Patient-reported           12/8/2024   Substance Use   Alcohol more than 3/day or more than 7/wk No   Do you use any other substances recreationally? No        Social History     Tobacco Use    Smoking status: Never    Smokeless tobacco: Never   Substance Use Topics    Alcohol use:  "No    Drug use: No           11/8/2023   LAST FHS-7 RESULTS   1st degree relative breast or ovarian cancer No   Any relative bilateral breast cancer No   Any male have breast cancer No   Any ONE woman have BOTH breast AND ovarian cancer No   Any woman with breast cancer before 50yrs No   2 or more relatives with breast AND/OR ovarian cancer No   2 or more relatives with breast AND/OR bowel cancer No           Mammogram Screening - Mammogram every 1-2 years updated in Health Maintenance based on mutual decision making        12/8/2024   STI Screening   New sexual partner(s) since last STI/HIV test? No        History of abnormal Pap smear: No - age 30- 64 PAP with HPV every 5 years recommended        Latest Ref Rng & Units 11/30/2023     9:46 AM 9/6/2018    12:26 PM 9/6/2018    12:16 PM   PAP / HPV   PAP  Negative for Intraepithelial Lesion or Malignancy (NILM)      PAP (Historical)    NIL    HPV 16 DNA Negative Negative  Negative     HPV 18 DNA Negative Negative  Negative     Other HR HPV Negative Negative  Negative       ASCVD Risk   The ASCVD Risk score (Anthony DK, et al., 2019) failed to calculate for the following reasons:    The valid HDL cholesterol range is 20 to 100 mg/dL           Reviewed and updated as needed this visit by Provider   Tobacco  Allergies  Meds                Past Medical History:   Diagnosis Date    Depressive disorder 2014    treated, stable now     Past Surgical History:   Procedure Laterality Date    BIOPSY  1/2009    ganglion cyst in foot, benign    GYN SURGERY  2014    endometrial ablation    SURGICAL HISTORY OF -  Right 2009    ganglion cyst         Review of Systems  Constitutional, HEENT, cardiovascular, pulmonary, gi and gu systems are negative, except as otherwise noted.     Objective    Exam  /86   Pulse 55   Temp 97.3  F (36.3  C) (Tympanic)   Resp 12   Ht 1.676 m (5' 6\")   Wt 55.3 kg (122 lb)   SpO2 99%   BMI 19.69 kg/m     Estimated body mass index is " "19.69 kg/m  as calculated from the following:    Height as of this encounter: 1.676 m (5' 6\").    Weight as of this encounter: 55.3 kg (122 lb).    Physical Exam  GENERAL: alert and no distress  EYES: Eyes grossly normal to inspection, PERRL and conjunctivae and sclerae normal  NECK: no adenopathy, no asymmetry, masses, or scars  RESP: lungs clear to auscultation - no rales, rhonchi or wheezes  BREAST: declined by pt  CV: regular rate and rhythm, normal S1 S2, no S3 or S4, no murmur, click or rub, no peripheral edema  ABDOMEN: soft, nontender, no hepatosplenomegaly, no masses and bowel sounds normal  MS: no gross musculoskeletal defects noted, no edema  NEURO: Normal strength and tone, mentation intact and speech normal  PSYCH: mentation appears normal and occasionally tearful when discussing HA's and mood        Signed Electronically by: Radha Olmstead DO    "

## 2025-02-04 ENCOUNTER — OFFICE VISIT (OUTPATIENT)
Dept: FAMILY MEDICINE | Facility: CLINIC | Age: 50
End: 2025-02-04
Payer: COMMERCIAL

## 2025-02-04 VITALS
TEMPERATURE: 97.7 F | HEIGHT: 66 IN | DIASTOLIC BLOOD PRESSURE: 70 MMHG | HEART RATE: 65 BPM | SYSTOLIC BLOOD PRESSURE: 106 MMHG | BODY MASS INDEX: 19.44 KG/M2 | WEIGHT: 121 LBS | OXYGEN SATURATION: 99 %

## 2025-02-04 DIAGNOSIS — F31.81 BIPOLAR 2 DISORDER (H): ICD-10-CM

## 2025-02-04 DIAGNOSIS — Z01.818 PREOP GENERAL PHYSICAL EXAM: Primary | ICD-10-CM

## 2025-02-04 DIAGNOSIS — H25.041 POSTERIOR SUBCAPSULAR POLAR AGE-RELATED CATARACT OF RIGHT EYE: ICD-10-CM

## 2025-02-04 PROCEDURE — 99213 OFFICE O/P EST LOW 20 MIN: CPT | Performed by: FAMILY MEDICINE

## 2025-02-04 NOTE — PATIENT INSTRUCTIONS
How to Take Your Medication Before Surgery  Preoperative Medication Instructions   Antiplatelet or Anticoagulation Medication Instructions   - Patient is on no antiplatelet or anticoagulation medications.    Additional Medication Instructions  Take all scheduled medications on the day of surgery EXCEPT for modifications listed below:   - Herbal medications and vitamins: DO NOT TAKE 14 days prior to surgery.   - Antiepileptics: Continue without modification.   - triptans, CGRP inhibitors, migraine abortives: DO NOT TAKE on day of surgery       Patient Education   Preparing for Your Surgery  For Adults  Getting started  In most cases, a nurse will call to review your health history and instructions. They will give you an arrival time based on your scheduled surgery time. Please be ready to share:  Your doctor's clinic name and phone number  Your medical, surgical, and anesthesia history  A list of allergies and sensitivities  A list of medicines, including herbal treatments and over-the-counter drugs  Whether the patient has a legal guardian (ask how to send us the papers in advance)  Note: You may not receive a call if you were seen at our PAC (Preoperative Assessment Center).  Please tell us if you're pregnant--or if there's any chance you might be pregnant. Some surgeries may injure a fetus (unborn baby), so they require a pregnancy test. Surgeries that are safe for a fetus don't always need a test, and you can choose whether to have one.   Preparing for surgery  Within 10 to 30 days of surgery: Have a pre-op exam (sometimes called an H&P, or History and Physical). This can be done at a clinic or pre-operative center.  If you're having a , you may not need this exam. Talk to your care team.  At your pre-op exam, talk to your care team about all medicines you take. (This includes CBD oil and any drugs, such as THC, marijuana, and other forms of cannabis.) If you need to stop any medicine before surgery, ask  when to start taking it again.  This is for your safety. Many medicines and drugs can make you bleed too much during surgery. Some change how well surgery (anesthesia) drugs work.  Call your insurance company to let them know you're having surgery. (If you don't have insurance, call [phone].)  Call your clinic if there's any change in your health. This includes a scrape or scratch near the surgery site, or any signs of a cold (sore throat, runny nose, cough, rash, fever).  Eating and drinking guidelines  For your safety: Unless your surgeon tells you otherwise, follow the guidelines below.  Eat and drink as normal until 8 hours before you arrive for surgery. After that, no food or milk. You can spit out gum when you arrive.  Drink clear liquids until 2 hours before you arrive. These are liquids you can see through, like water, Gatorade, and Propel Water. They also include plain black coffee and tea (no cream or milk).  No alcohol for 24 hours before you arrive. The night before surgery, stop any drinks that contain THC.  If your care team tells you to take medicine on the morning of surgery, it's okay to take it with a sip of water. No other medicines or drugs are allowed (including CBD oil)--follow your care team's instructions.  If you have questions the day of surgery, call your hospital or surgery center.   Preventing infection  Shower or bathe the night before and the morning of surgery. Follow the instructions your clinic gave you. (If no instructions, use regular soap.)  Don't shave or clip hair near your surgery site. We'll remove the hair if needed.  Don't smoke or vape the morning of surgery. No chewing tobacco for 6 hours before you arrive. A nicotine patch is okay. You may spit out nicotine gum when you arrive.  For some surgeries, the surgeon will tell you to fully quit smoking and nicotine.  We will make every effort to keep you safe from infection. We will:  Clean our hands often with soap and  water (or an alcohol-based hand rub).  Clean the skin at your surgery site with a special soap that kills germs.  Give you a special gown to keep you warm. (Cold raises the risk of infection.)  Wear hair covers, masks, gowns, and gloves during surgery.  Give antibiotic medicine, if prescribed. Not all surgeries need this medicine.  What to bring on the day of surgery  Photo ID and insurance card  Copy of your health care directive, if you have one  Glasses and hearing aids (bring cases)  You can't wear contacts during surgery  Inhaler and eye drops, if you use them (tell us about these when you arrive)  CPAP machine or breathing device, if you use them  A few personal items, if spending the night  If you have . . .  A pacemaker, ICD (cardiac defibrillator), or other implant: Bring the ID card.  An implanted stimulator: Bring the remote control.  A legal guardian: Bring a copy of the certified (court-stamped) guardianship papers.  Please remove any jewelry, including body piercings. Leave jewelry and other valuables at home.  If you're going home the day of surgery  You must have a responsible adult drive you home. They should stay with you overnight as well.  If you don't have someone to stay with you, and you aren't safe to go home alone, we may keep you overnight. Insurance often won't pay for this.  After surgery  If it's hard to control your pain or you need more pain medicine, please call your surgeon's office.  Questions?   If you have any questions for your care team, list them here:   ____________________________________________________________________________________________________________________________________________________________________________________________________________________________________________________________  For informational purposes only. Not to replace the advice of your health care provider. Copyright   2003, 2019 [facility]. All rights reserved. Clinically  reviewed by Jin Blanco MD. [employer] [id number] - REV 08/24.

## 2025-02-04 NOTE — PROGRESS NOTES
Preoperative Evaluation  [facility]  [address] [facility] [address]  Phone: [phone]  Primary Provider: Radha Olmstead DO  Pre-op Performing Provider: Ansley Hill MD  Feb 4, 2025 1/31/2025   Surgical Information   What procedure is being done? Cataract surgery right eye   Facility or Hospital where procedure/surgery will be performed: [facility] [id number] [place] [address]   Who is doing the procedure / surgery? Dr. Efrain Camargo   Date of surgery / procedure: March 12, 2025   Time of surgery / procedure: To be determined   Where do you plan to recover after surgery? at home with family     Fax number for surgical facility: [phone]    Assessment & Plan     The proposed surgical procedure is considered LOW risk.    Preop general physical exam      Posterior subcapsular polar age-related cataract of right eye      Bipolar 2 disorder (H)  Stable on trileptal and lexapro            - No identified additional risk factors other than previously addressed    Antiplatelet or Anticoagulation Medication Instructions   - Patient is on no antiplatelet or anticoagulation medications.    Additional Medication Instructions  Take all scheduled medications on the day of surgery EXCEPT for modifications listed below:  Just take the mental health medications no migraine med the noght before     Recommendation  Approval given to proceed with proposed procedure, without further diagnostic evaluation.    Samantha Romero is a 49 year old, presenting for the following:  Pre-Op Exam      HPI related to upcoming procedure: worsening of the vision         1/31/2025   Pre-Op Questionnaire   Have you ever had a heart attack or stroke? No   Have you ever had surgery on your heart or blood vessels, such as a stent placement, a coronary artery bypass, or surgery on an artery in your head, neck, heart, or legs? No   Do you have chest pain with activity? No   Do you  have a history of heart failure? No   Do you currently have a cold, bronchitis or symptoms of other infection? No   Do you have a cough, shortness of breath, or wheezing? No   Do you or anyone in your family have previous history of blood clots? No   Do you or does anyone in your family have a serious bleeding problem such as prolonged bleeding following surgeries or cuts? No   Have you ever had problems with anemia or been told to take iron pills? No   Have you had any abnormal blood loss such as black, tarry or bloody stools, or abnormal vaginal bleeding? No   Have you ever had a blood transfusion? No   Are you willing to have a blood transfusion if it is medically needed before, during, or after your surgery? Yes   Have you or any of your relatives ever had problems with anesthesia? No   Do you have sleep apnea, excessive snoring or daytime drowsiness? No   Do you have any artifical heart valves or other implanted medical devices like a pacemaker, defibrillator, or continuous glucose monitor? No   Do you have artificial joints? No   Are you allergic to latex? No     Health Care Directive  Patient does not have a Health Care Directive: Discussed advance care planning with patient; information given to patient to review.    Preoperative Review of    reviewed - controlled substances reflected in medication list.      Status of Chronic Conditions:  See problem list for active medical problems.  Problems all longstanding and stable, except as noted/documented.  See ROS for pertinent symptoms related to these conditions.    Patient Active Problem List    Diagnosis Date Noted    Urinary urgency 10/04/2021     Priority: Medium    Frequency of urination 10/04/2021     Priority: Medium    Bipolar 2 disorder (H) 08/02/2018     Priority: Medium    Interstitial cystitis 08/02/2018     Priority: Medium    Skin lesion 08/02/2018     Priority: Medium      Past Medical History:   Diagnosis Date    Depressive disorder 2014  "   treated, stable now     Past Surgical History:   Procedure Laterality Date    BIOPSY  1/2009    ganglion cyst in foot, benign    GYN SURGERY  2014    endometrial ablation    SURGICAL HISTORY OF -  Right 2009    ganglion cyst     Current Outpatient Medications   Medication Sig Dispense Refill    ALPRAZolam (XANAX) 0.5 MG tablet Take 0.5 mg by mouth daily as needed for anxiety      diazepam (VALIUM) 5 MG tablet Place 5mg vaginally at bedtime as needed for interstitial cystitis 30 tablet 0    escitalopram (LEXAPRO) 10 MG tablet Take 1 tablet (10 mg) by mouth daily      Fremanezumab-vfrm (AJOVY) 225 MG/1.5ML SOAJ every 4 weeks.      naratriptan (AMERGE) 2.5 MG tablet Take 2.5 mg by mouth at onset of headache for migraine      OXcarbazepine (TRILEPTAL) 300 MG tablet Take 600 mg by mouth At Bedtime      oxyBUTYnin ER (DITROPAN XL) 5 MG 24 hr tablet Take 1 tablet (5 mg) by mouth daily. 90 tablet 2    rizatriptan (MAXALT-MLT) 5 MG ODT Take 5 mg by mouth at onset of headache.         No Known Allergies     Social History     Tobacco Use    Smoking status: Never    Smokeless tobacco: Never   Substance Use Topics    Alcohol use: No       History   Drug Use No             Review of Systems  Constitutional, HEENT, cardiovascular, pulmonary, gi and gu systems are negative, except as otherwise noted.    Objective    /70 (BP Location: Right arm, Patient Position: Sitting, Cuff Size: Adult Regular)   Pulse 65   Temp 97.7  F (36.5  C) (Tympanic)   Ht 1.676 m (5' 6\")   Wt 54.9 kg (121 lb)   SpO2 99%   BMI 19.53 kg/m     Estimated body mass index is 19.53 kg/m  as calculated from the following:    Height as of this encounter: 1.676 m (5' 6\").    Weight as of this encounter: 54.9 kg (121 lb).  Physical Exam  GENERAL: alert and no distress  RESP: lungs clear to auscultation - no rales, rhonchi or wheezes  CV: regular rate and rhythm, normal S1 S2, no S3 or S4, no murmur, click or rub, no peripheral edema  MS: no gross " "musculoskeletal defects noted, no edema    No results for input(s): \"HGB\", \"PLT\", \"INR\", \"NA\", \"POTASSIUM\", \"CR\", \"A1C\" in the last 8760 hours.     Diagnostics  No labs were ordered during this visit.   No EKG required for low risk surgery (cataract, skin procedure, breast biopsy, etc).    Revised Cardiac Risk Index (RCRI)  The patient has the following serious cardiovascular risks for perioperative complications:   - No serious cardiac risks = 0 points     RCRI Interpretation: 0 points: Class I (very low risk - 0.4% complication rate)         Signed Electronically by: Ansley Hill MD  A copy of this evaluation report is provided to the requesting physician.         "

## 2025-08-01 ENCOUNTER — TRANSFERRED RECORDS (OUTPATIENT)
Dept: HEALTH INFORMATION MANAGEMENT | Facility: CLINIC | Age: 50
End: 2025-08-01
Payer: COMMERCIAL